# Patient Record
Sex: MALE | Race: WHITE | Employment: FULL TIME | URBAN - METROPOLITAN AREA
[De-identification: names, ages, dates, MRNs, and addresses within clinical notes are randomized per-mention and may not be internally consistent; named-entity substitution may affect disease eponyms.]

---

## 2017-08-15 ENCOUNTER — HOSPITAL ENCOUNTER (OUTPATIENT)
Dept: CT IMAGING | Age: 62
Discharge: HOME | End: 2017-08-15
Attending: OTOLARYNGOLOGY
Payer: COMMERCIAL

## 2017-08-15 DIAGNOSIS — H91.20 SUDDEN HEARING LOSS: ICD-10-CM

## 2017-08-15 LAB — CREAT BLD-MCNC: 1.2 MG/DL (ref 0.6–1.3)

## 2017-08-15 PROCEDURE — 82565 ASSAY OF CREATININE: CPT

## 2017-08-15 PROCEDURE — 70470 CT HEAD/BRAIN W/O & W/DYE: CPT

## 2017-08-15 PROCEDURE — 74011250636 HC RX REV CODE- 250/636: Performed by: OTOLARYNGOLOGY

## 2017-08-15 PROCEDURE — 74011636320 HC RX REV CODE- 636/320: Performed by: OTOLARYNGOLOGY

## 2017-08-15 RX ORDER — SODIUM CHLORIDE 9 MG/ML
50 INJECTION, SOLUTION INTRAVENOUS
Status: COMPLETED | OUTPATIENT
Start: 2017-08-15 | End: 2017-08-15

## 2017-08-15 RX ORDER — SODIUM CHLORIDE 0.9 % (FLUSH) 0.9 %
10 SYRINGE (ML) INJECTION
Status: COMPLETED | OUTPATIENT
Start: 2017-08-15 | End: 2017-08-15

## 2017-08-15 RX ADMIN — Medication 10 ML: at 09:10

## 2017-08-15 RX ADMIN — SODIUM CHLORIDE 50 ML/HR: 900 INJECTION, SOLUTION INTRAVENOUS at 09:10

## 2017-08-15 RX ADMIN — IOPAMIDOL 100 ML: 612 INJECTION, SOLUTION INTRAVENOUS at 09:10

## 2022-01-19 ENCOUNTER — APPOINTMENT (OUTPATIENT)
Dept: CT IMAGING | Age: 67
DRG: 638 | End: 2022-01-19
Attending: EMERGENCY MEDICINE
Payer: MEDICARE

## 2022-01-19 ENCOUNTER — TRANSCRIBE ORDER (OUTPATIENT)
Dept: SCHEDULING | Age: 67
End: 2022-01-19

## 2022-01-19 ENCOUNTER — HOSPITAL ENCOUNTER (INPATIENT)
Age: 67
LOS: 4 days | Discharge: HOME | DRG: 638 | End: 2022-01-23
Attending: EMERGENCY MEDICINE | Admitting: INTERNAL MEDICINE
Payer: MEDICARE

## 2022-01-19 DIAGNOSIS — L03.116 CELLULITIS OF LEFT LOWER EXTREMITY: Primary | ICD-10-CM

## 2022-01-19 DIAGNOSIS — S97.82XD CRUSHING INJURY OF LEFT FOOT, SUBSEQUENT ENCOUNTER: Primary | ICD-10-CM
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
## 2022-01-19 LAB
ALBUMIN SERPL-MCNC: 4.5 G/DL (ref 3.5–5)
ALBUMIN/GLOB SERPL: 1.4 {RATIO} (ref 1.1–2.2)
ALP SERPL-CCNC: 50 U/L (ref 45–117)
ALT SERPL-CCNC: 37 U/L (ref 12–78)
ANION GAP SERPL CALC-SCNC: 7 MMOL/L (ref 5–15)
AST SERPL-CCNC: 28 U/L (ref 15–37)
BASOPHILS # BLD: 0 K/UL (ref 0–0.1)
BASOPHILS NFR BLD: 0 % (ref 0–1)
BILIRUB SERPL-MCNC: 0.5 MG/DL (ref 0.2–1)
BUN SERPL-MCNC: 19 MG/DL (ref 6–20)
BUN/CREAT SERPL: 13 (ref 12–20)
CALCIUM SERPL-MCNC: 9.3 MG/DL (ref 8.5–10.1)
CHLORIDE SERPL-SCNC: 103 MMOL/L (ref 97–108)
CO2 SERPL-SCNC: 27 MMOL/L (ref 21–32)
CREAT SERPL-MCNC: 1.48 MG/DL (ref 0.7–1.3)
DIFFERENTIAL METHOD BLD: ABNORMAL
EOSINOPHIL # BLD: 0.1 K/UL (ref 0–0.4)
EOSINOPHIL NFR BLD: 1 % (ref 0–7)
ERYTHROCYTE [DISTWIDTH] IN BLOOD BY AUTOMATED COUNT: 13.5 % (ref 11.5–14.5)
GLOBULIN SER CALC-MCNC: 3.3 G/DL (ref 2–4)
GLUCOSE BLD STRIP.AUTO-MCNC: 157 MG/DL (ref 65–117)
GLUCOSE SERPL-MCNC: 135 MG/DL (ref 65–100)
HCT VFR BLD AUTO: 42.3 % (ref 36.6–50.3)
HGB BLD-MCNC: 14 G/DL (ref 12.1–17)
IMM GRANULOCYTES # BLD AUTO: 0 K/UL (ref 0–0.04)
IMM GRANULOCYTES NFR BLD AUTO: 1 % (ref 0–0.5)
LYMPHOCYTES # BLD: 1.7 K/UL (ref 0.8–3.5)
LYMPHOCYTES NFR BLD: 26 % (ref 12–49)
MCH RBC QN AUTO: 32.3 PG (ref 26–34)
MCHC RBC AUTO-ENTMCNC: 33.1 G/DL (ref 30–36.5)
MCV RBC AUTO: 97.5 FL (ref 80–99)
MONOCYTES # BLD: 0.7 K/UL (ref 0–1)
MONOCYTES NFR BLD: 11 % (ref 5–13)
NEUTS SEG # BLD: 3.9 K/UL (ref 1.8–8)
NEUTS SEG NFR BLD: 61 % (ref 32–75)
NRBC # BLD: 0 K/UL (ref 0–0.01)
NRBC BLD-RTO: 0 PER 100 WBC
PLATELET # BLD AUTO: 141 K/UL (ref 150–400)
PMV BLD AUTO: 8.8 FL (ref 8.9–12.9)
POTASSIUM SERPL-SCNC: 4.4 MMOL/L (ref 3.5–5.1)
PROT SERPL-MCNC: 7.8 G/DL (ref 6.4–8.2)
RBC # BLD AUTO: 4.34 M/UL (ref 4.1–5.7)
SERVICE CMNT-IMP: ABNORMAL
SODIUM SERPL-SCNC: 137 MMOL/L (ref 136–145)
WBC # BLD AUTO: 6.4 K/UL (ref 4.1–11.1)

## 2022-01-19 PROCEDURE — 74011250636 HC RX REV CODE- 250/636: Performed by: INTERNAL MEDICINE

## 2022-01-19 PROCEDURE — 87040 BLOOD CULTURE FOR BACTERIA: CPT

## 2022-01-19 PROCEDURE — 99283 EMERGENCY DEPT VISIT LOW MDM: CPT

## 2022-01-19 PROCEDURE — 74011250637 HC RX REV CODE- 250/637: Performed by: EMERGENCY MEDICINE

## 2022-01-19 PROCEDURE — 65660000000 HC RM CCU STEPDOWN

## 2022-01-19 PROCEDURE — 80053 COMPREHEN METABOLIC PANEL: CPT

## 2022-01-19 PROCEDURE — 74011250637 HC RX REV CODE- 250/637: Performed by: INTERNAL MEDICINE

## 2022-01-19 PROCEDURE — 85025 COMPLETE CBC W/AUTO DIFF WBC: CPT

## 2022-01-19 PROCEDURE — 73700 CT LOWER EXTREMITY W/O DYE: CPT

## 2022-01-19 PROCEDURE — 82962 GLUCOSE BLOOD TEST: CPT

## 2022-01-19 PROCEDURE — 36415 COLL VENOUS BLD VENIPUNCTURE: CPT

## 2022-01-19 PROCEDURE — 74011000258 HC RX REV CODE- 258: Performed by: INTERNAL MEDICINE

## 2022-01-19 PROCEDURE — 74011000250 HC RX REV CODE- 250: Performed by: INTERNAL MEDICINE

## 2022-01-19 RX ORDER — INSULIN LISPRO 100 [IU]/ML
INJECTION, SOLUTION INTRAVENOUS; SUBCUTANEOUS
Status: DISCONTINUED | OUTPATIENT
Start: 2022-01-19 | End: 2022-01-23 | Stop reason: HOSPADM

## 2022-01-19 RX ORDER — ACETAMINOPHEN 325 MG/1
650 TABLET ORAL
Status: DISCONTINUED | OUTPATIENT
Start: 2022-01-19 | End: 2022-01-19 | Stop reason: SDUPTHER

## 2022-01-19 RX ORDER — LISINOPRIL 5 MG/1
10 TABLET ORAL DAILY
Status: DISCONTINUED | OUTPATIENT
Start: 2022-01-20 | End: 2022-01-23 | Stop reason: HOSPADM

## 2022-01-19 RX ORDER — OXYCODONE AND ACETAMINOPHEN 5; 325 MG/1; MG/1
1 TABLET ORAL
Status: DISCONTINUED | OUTPATIENT
Start: 2022-01-19 | End: 2022-01-23 | Stop reason: HOSPADM

## 2022-01-19 RX ORDER — ENOXAPARIN SODIUM 100 MG/ML
40 INJECTION SUBCUTANEOUS DAILY
Status: DISCONTINUED | OUTPATIENT
Start: 2022-01-20 | End: 2022-01-23 | Stop reason: HOSPADM

## 2022-01-19 RX ORDER — OXYCODONE AND ACETAMINOPHEN 5; 325 MG/1; MG/1
1 TABLET ORAL
Status: COMPLETED | OUTPATIENT
Start: 2022-01-19 | End: 2022-01-19

## 2022-01-19 RX ORDER — DEXTROSE 50 % IN WATER (D50W) INTRAVENOUS SYRINGE
12.5-25 AS NEEDED
Status: DISCONTINUED | OUTPATIENT
Start: 2022-01-19 | End: 2022-01-23 | Stop reason: HOSPADM

## 2022-01-19 RX ORDER — POLYETHYLENE GLYCOL 3350 17 G/17G
17 POWDER, FOR SOLUTION ORAL DAILY PRN
Status: DISCONTINUED | OUTPATIENT
Start: 2022-01-19 | End: 2022-01-23 | Stop reason: HOSPADM

## 2022-01-19 RX ORDER — MAGNESIUM SULFATE 100 %
4 CRYSTALS MISCELLANEOUS AS NEEDED
Status: DISCONTINUED | OUTPATIENT
Start: 2022-01-19 | End: 2022-01-23 | Stop reason: HOSPADM

## 2022-01-19 RX ORDER — SODIUM CHLORIDE 0.9 % (FLUSH) 0.9 %
5-40 SYRINGE (ML) INJECTION AS NEEDED
Status: DISCONTINUED | OUTPATIENT
Start: 2022-01-19 | End: 2022-01-23 | Stop reason: HOSPADM

## 2022-01-19 RX ORDER — ACETAMINOPHEN 650 MG/1
650 SUPPOSITORY RECTAL
Status: DISCONTINUED | OUTPATIENT
Start: 2022-01-19 | End: 2022-01-23 | Stop reason: HOSPADM

## 2022-01-19 RX ORDER — VANCOMYCIN HYDROCHLORIDE
1250
Status: DISCONTINUED | OUTPATIENT
Start: 2022-01-20 | End: 2022-01-21

## 2022-01-19 RX ORDER — PANTOPRAZOLE SODIUM 40 MG/1
40 TABLET, DELAYED RELEASE ORAL
Status: DISCONTINUED | OUTPATIENT
Start: 2022-01-20 | End: 2022-01-23 | Stop reason: HOSPADM

## 2022-01-19 RX ORDER — ACETAMINOPHEN 325 MG/1
650 TABLET ORAL
Status: DISCONTINUED | OUTPATIENT
Start: 2022-01-19 | End: 2022-01-23 | Stop reason: HOSPADM

## 2022-01-19 RX ORDER — SODIUM CHLORIDE 0.9 % (FLUSH) 0.9 %
5-40 SYRINGE (ML) INJECTION EVERY 8 HOURS
Status: DISCONTINUED | OUTPATIENT
Start: 2022-01-19 | End: 2022-01-23 | Stop reason: HOSPADM

## 2022-01-19 RX ORDER — ROSUVASTATIN CALCIUM 10 MG/1
20 TABLET, COATED ORAL
Status: DISCONTINUED | OUTPATIENT
Start: 2022-01-19 | End: 2022-01-23 | Stop reason: HOSPADM

## 2022-01-19 RX ORDER — BISOPROLOL FUMARATE AND HYDROCHLOROTHIAZIDE 5; 6.25 MG/1; MG/1
1 TABLET ORAL DAILY
Status: DISCONTINUED | OUTPATIENT
Start: 2022-01-20 | End: 2022-01-23 | Stop reason: HOSPADM

## 2022-01-19 RX ORDER — ONDANSETRON 2 MG/ML
4 INJECTION INTRAMUSCULAR; INTRAVENOUS
Status: DISCONTINUED | OUTPATIENT
Start: 2022-01-19 | End: 2022-01-23 | Stop reason: HOSPADM

## 2022-01-19 RX ORDER — ONDANSETRON 4 MG/1
4 TABLET, ORALLY DISINTEGRATING ORAL
Status: DISCONTINUED | OUTPATIENT
Start: 2022-01-19 | End: 2022-01-23 | Stop reason: HOSPADM

## 2022-01-19 RX ORDER — VANCOMYCIN 2 GRAM/500 ML IN 0.9 % SODIUM CHLORIDE INTRAVENOUS
2000
Status: COMPLETED | OUTPATIENT
Start: 2022-01-19 | End: 2022-01-20

## 2022-01-19 RX ORDER — ASPIRIN 81 MG/1
81 TABLET ORAL DAILY
Status: DISCONTINUED | OUTPATIENT
Start: 2022-01-20 | End: 2022-01-23 | Stop reason: HOSPADM

## 2022-01-19 RX ADMIN — ROSUVASTATIN CALCIUM 20 MG: 10 TABLET, FILM COATED ORAL at 22:14

## 2022-01-19 RX ADMIN — OXYCODONE AND ACETAMINOPHEN 1 TABLET: 5; 325 TABLET ORAL at 18:55

## 2022-01-19 RX ADMIN — CEFTRIAXONE 1 G: 1 INJECTION, POWDER, FOR SOLUTION INTRAMUSCULAR; INTRAVENOUS at 19:41

## 2022-01-19 RX ADMIN — VANCOMYCIN HYDROCHLORIDE 2000 MG: 5 INJECTION, POWDER, LYOPHILIZED, FOR SOLUTION INTRAVENOUS at 19:41

## 2022-01-19 RX ADMIN — ACETAMINOPHEN 650 MG: 325 TABLET ORAL at 22:21

## 2022-01-19 RX ADMIN — SODIUM CHLORIDE, PRESERVATIVE FREE 10 ML: 5 INJECTION INTRAVENOUS at 22:14

## 2022-01-19 NOTE — ED PROVIDER NOTES
EMERGENCY DEPARTMENT HISTORY AND PHYSICAL EXAM      Date: 1/19/2022  Patient Name: Vu Monahan    History of Presenting Illness     Chief Complaint   Patient presents with    Wound Check     Patient stated he was sent to ED by Dr. Leola Chester for antibiotics for an infected left which he sustained form a tractor blade on 1/11. Patient now has pain, swelling and black discoloration on aterior aspect of left foot. History Provided By: Patient    HPI: Vu Monahan, 74 y.o. male presents to the ED with cc of left foot wound. 40-year-old male presents emergency department with a chief complaint of L foot wound. Patient reports symptoms began after he dropped a snowplow blade that weighed a couple hundred pounds on his left foot. He did not lacerate his foot. He reports he saw Ortho on-call as well as orthopedic surgery today. Patient was referred to the ED for admission. Patient reports he has pain in the midfoot. Occasional burning pain. Worse with bearing weight. He notes an eschar to the dorsum of the foot as well as some surrounding erythema. Denies fevers or chills, chest pain or shortness of breath. There are no other complaints, changes, or physical findings at this time. PCP: Garcia Shin NP    No current facility-administered medications on file prior to encounter. Current Outpatient Medications on File Prior to Encounter   Medication Sig Dispense Refill    promethazine (PHENERGAN) 25 mg tablet Take 1 Tab by mouth every six (6) hours as needed. 20 Tab 0    oxyCODONE-acetaminophen (PERCOCET) 5-325 mg per tablet Take 1-2 tablets by mouth every four (4) hours as needed for Pain. 30 tablet 0    ondansetron (ZOFRAN ODT) 4 mg disintegrating tablet Take 1 tablet by mouth every eight (8) hours as needed for Nausea. 10 tablet 0    nitroglycerin (NITROSTAT) 0.4 mg SL tablet by SubLINGual route every five (5) minutes as needed for Chest Pain.       metformin (GLUCOPHAGE) 1,000 mg tablet Take 1,000 mg by mouth two (2) times daily (with meals).  LIRAGLUTIDE (VICTOZA 2-STEPH SC) by SubCUTAneous route.  dapagliflozin (FARXIGA) 5 mg tab Take  by mouth.  bisoprolol-hydrochlorothiazide (ZIAC) 5-6.25 mg per tablet Take 1 tablet by mouth daily.  omeprazole (PRILOSEC) 40 mg capsule Take 40 mg by mouth daily.  rosuvastatin (CRESTOR) 20 mg tablet Take 20 mg by mouth nightly.  lisinopril (PRINIVIL, ZESTRIL) 10 mg tablet Take  by mouth daily.  aspirin delayed-release 81 mg tablet Take  by mouth daily. Past History     Past Medical History:  Past Medical History:   Diagnosis Date    Diabetes ([facility] [address]     Hypercholesterolemia     Hypertension        Past Surgical History:  Past Surgical History:   Procedure Laterality Date    HX CHOLECYSTECTOMY  2/3/15    Laparoscopic cholecystectomy    HX GI      colonoscopy    HX ORTHOPAEDIC      left wrist fx   [place]    B[facility] Surgery @ [facility] - hemorrhage        Family History:  Family History   Problem Relation Age of Onset   Theodoro Milder Stroke Mother     Other Mother         aneurysm    Hypertension Father     Cancer Father         Bone and Lung Cancer       Social History:  Social History     Tobacco Use    Smoking status: Former Smoker     Packs/day: 1.00     Years: 20.00     Pack years: 20.00     Quit date: 2000     Years since quittin.9    Smokeless tobacco: Not on file   Substance Use Topics    Alcohol use: Yes     Comment: \"a couple of beers a day\"    Drug use: No       Allergies:  No Known Allergies      Review of Systems   Review of Systems   Constitutional: Negative for chills and fever. HENT: Negative for voice change. Eyes: Negative for pain and redness. Respiratory: Negative for cough and chest tightness. Cardiovascular: Negative for chest pain and leg swelling. Gastrointestinal: Negative for abdominal pain, diarrhea, nausea and vomiting. Genitourinary: Negative for hematuria. Musculoskeletal: Negative for gait problem. Skin: Positive for color change and wound. Negative for pallor and rash. Neurological: Negative for facial asymmetry, weakness and headaches. Hematological: Does not bruise/bleed easily. Psychiatric/Behavioral: Negative for behavioral problems. All other systems reviewed and are negative. Physical Exam   Physical Exam  Vitals and nursing note reviewed. Constitutional:       Comments: 76-year-old male, resting in bed, no distress   HENT:      Head: Normocephalic and atraumatic. Nose: Nose normal.      Mouth/Throat:      Mouth: Mucous membranes are moist.   Eyes:      Pupils: Pupils are equal, round, and reactive to light. Cardiovascular:      Rate and Rhythm: Normal rate and regular rhythm. Pulses: Normal pulses. Heart sounds: No murmur heard. No friction rub. No gallop. Pulmonary:      Effort: Pulmonary effort is normal.      Breath sounds: Normal breath sounds. No wheezing, rhonchi or rales. Abdominal:      General: Abdomen is flat. There is no distension. Palpations: Abdomen is soft. Tenderness: There is no abdominal tenderness. Musculoskeletal:         General: Swelling, tenderness and signs of injury present. Normal range of motion. Cervical back: Normal range of motion. Right lower leg: No edema. Left lower leg: No edema. Comments: There is swelling of the forefoot with ecchymosis of all 5 toes. There is tenderness to palpation. 1+ DP and PT pulses. Compartments soft. Skin:     General: Skin is warm and dry. Capillary Refill: Capillary refill takes less than 2 seconds. Findings: Erythema present. Comments: There is a 2 x 2 centimeter eschar over the dorsum of the left lateral foot. There is surrounding erythema and a flaccid bullae distally. Neurological:      General: No focal deficit present. Mental Status: He is alert. Psychiatric:         Mood and Affect: Mood normal.         Diagnostic Study Results     Labs -     Recent Results (from the past 12 hour(s))   CBC WITH AUTOMATED DIFF    Collection Time: 01/19/22  3:55 PM   Result Value Ref Range    WBC 6.4 4.1 - 11.1 K/uL    RBC 4.34 4.10 - 5.70 M/uL    HGB 14.0 12.1 - 17.0 g/dL    HCT 42.3 36.6 - 50.3 %    MCV 97.5 80.0 - 99.0 FL    MCH 32.3 26.0 - 34.0 PG    MCHC 33.1 30.0 - 36.5 g/dL    RDW 13.5 11.5 - 14.5 %    PLATELET 170 (L) 607 - 400 K/uL    MPV 8.8 (L) 8.9 - 12.9 FL    NRBC 0.0 0  WBC    ABSOLUTE NRBC 0.00 0.00 - 0.01 K/uL    NEUTROPHILS 61 32 - 75 %    LYMPHOCYTES 26 12 - 49 %    MONOCYTES 11 5 - 13 %    EOSINOPHILS 1 0 - 7 %    BASOPHILS 0 0 - 1 %    IMMATURE GRANULOCYTES 1 (H) 0.0 - 0.5 %    ABS. NEUTROPHILS 3.9 1.8 - 8.0 K/UL    ABS. LYMPHOCYTES 1.7 0.8 - 3.5 K/UL    ABS. MONOCYTES 0.7 0.0 - 1.0 K/UL    ABS. EOSINOPHILS 0.1 0.0 - 0.4 K/UL    ABS. BASOPHILS 0.0 0.0 - 0.1 K/UL    ABS. IMM. GRANS. 0.0 0.00 - 0.04 K/UL    DF AUTOMATED     METABOLIC PANEL, COMPREHENSIVE    Collection Time: 01/19/22  3:55 PM   Result Value Ref Range    Sodium 137 136 - 145 mmol/L    Potassium 4.4 3.5 - 5.1 mmol/L    Chloride 103 97 - 108 mmol/L    CO2 27 21 - 32 mmol/L    Anion gap 7 5 - 15 mmol/L    Glucose 135 (H) 65 - 100 mg/dL    BUN 19 6 - 20 MG/DL    Creatinine 1.48 (H) 0.70 - 1.30 MG/DL    BUN/Creatinine ratio 13 12 - 20      GFR est AA 57 (L) >60 ml/min/1.73m2    GFR est non-AA 47 (L) >60 ml/min/1.73m2    Calcium 9.3 8.5 - 10.1 MG/DL    Bilirubin, total 0.5 0.2 - 1.0 MG/DL    ALT (SGPT) 37 12 - 78 U/L    AST (SGOT) 28 15 - 37 U/L    Alk.  phosphatase 50 45 - 117 U/L    Protein, total 7.8 6.4 - 8.2 g/dL    Albumin 4.5 3.5 - 5.0 g/dL    Globulin 3.3 2.0 - 4.0 g/dL    A-G Ratio 1.4 1.1 - 2.2         Radiologic Studies -   CT LOW EXT LT WO CONT    (Results Pending)     CT Results  (Last 48 hours)    None        CXR Results  (Last 48 hours)    None          Medical Decision Making I am the first provider for this patient. I reviewed the vital signs, available nursing notes, past medical history, past surgical history, family history and social history. Vital Signs-Reviewed the patient's vital signs. Patient Vitals for the past 12 hrs:   Temp Pulse Resp BP SpO2   01/19/22 1543 97.7 °F (36.5 °C) 74 16 131/68 95 %     Records Reviewed: Nursing Notes and Old Medical Records    Provider Notes (Medical Decision Making):     74 YOM presents with L foot pain after a crush injury. Patient's vitals are stable. Clinically he does appear to have cellulitis around L foot. Other considerations include abscess or osteomyelitis from crush injury. There may be an underlying fracture. I do not have radiology results to review. Does not appear to be necrotizing fasciitis. Discussed with Esvin Jenkins, recommends IV antibiotics and CT. Ortho consult, saw outpatient ortho today. Labs are unremarkable, will send blood cultures. Discussed with hospitalist for admission. ED Course:   Initial assessment performed. The patients presenting problems have been discussed, and they are in agreement with the care plan formulated and outlined with them. I have encouraged them to ask questions as they arise throughout their visit. Christine Morris MD      Disposition:    Admitted      Diagnosis     Clinical Impression:   1. Cellulitis of left lower extremity    2. Crush accident        Attestations:    Christine Morris MD    Please note that this dictation was completed with [email or web address], the computer voice recognition software. Quite often unanticipated grammatical, syntax, homophones, and other interpretive errors are inadvertently transcribed by the computer software. Please disregard these errors. Please excuse any errors that have escaped final proofreading. Thank you.

## 2022-01-20 LAB
ANION GAP SERPL CALC-SCNC: 6 MMOL/L (ref 5–15)
BUN SERPL-MCNC: 20 MG/DL (ref 6–20)
BUN/CREAT SERPL: 19 (ref 12–20)
CALCIUM SERPL-MCNC: 8.7 MG/DL (ref 8.5–10.1)
CHLORIDE SERPL-SCNC: 106 MMOL/L (ref 97–108)
CO2 SERPL-SCNC: 26 MMOL/L (ref 21–32)
CREAT SERPL-MCNC: 1.06 MG/DL (ref 0.7–1.3)
ERYTHROCYTE [DISTWIDTH] IN BLOOD BY AUTOMATED COUNT: 13.5 % (ref 11.5–14.5)
GLUCOSE BLD STRIP.AUTO-MCNC: 152 MG/DL (ref 65–117)
GLUCOSE BLD STRIP.AUTO-MCNC: 183 MG/DL (ref 65–117)
GLUCOSE BLD STRIP.AUTO-MCNC: 259 MG/DL (ref 65–117)
GLUCOSE SERPL-MCNC: 139 MG/DL (ref 65–100)
HCT VFR BLD AUTO: 37.5 % (ref 36.6–50.3)
HGB BLD-MCNC: 12.6 G/DL (ref 12.1–17)
MCH RBC QN AUTO: 32.3 PG (ref 26–34)
MCHC RBC AUTO-ENTMCNC: 33.6 G/DL (ref 30–36.5)
MCV RBC AUTO: 96.2 FL (ref 80–99)
NRBC # BLD: 0 K/UL (ref 0–0.01)
NRBC BLD-RTO: 0 PER 100 WBC
PLATELET # BLD AUTO: 130 K/UL (ref 150–400)
PMV BLD AUTO: 8.7 FL (ref 8.9–12.9)
POTASSIUM SERPL-SCNC: 3.7 MMOL/L (ref 3.5–5.1)
RBC # BLD AUTO: 3.9 M/UL (ref 4.1–5.7)
SERVICE CMNT-IMP: ABNORMAL
SODIUM SERPL-SCNC: 138 MMOL/L (ref 136–145)
WBC # BLD AUTO: 5.6 K/UL (ref 4.1–11.1)

## 2022-01-20 PROCEDURE — 74011250637 HC RX REV CODE- 250/637: Performed by: INTERNAL MEDICINE

## 2022-01-20 PROCEDURE — 74011000258 HC RX REV CODE- 258: Performed by: INTERNAL MEDICINE

## 2022-01-20 PROCEDURE — 74011000250 HC RX REV CODE- 250: Performed by: INTERNAL MEDICINE

## 2022-01-20 PROCEDURE — 80048 BASIC METABOLIC PNL TOTAL CA: CPT

## 2022-01-20 PROCEDURE — 85027 COMPLETE CBC AUTOMATED: CPT

## 2022-01-20 PROCEDURE — 65660000000 HC RM CCU STEPDOWN

## 2022-01-20 PROCEDURE — 36415 COLL VENOUS BLD VENIPUNCTURE: CPT

## 2022-01-20 PROCEDURE — 74011250636 HC RX REV CODE- 250/636: Performed by: INTERNAL MEDICINE

## 2022-01-20 PROCEDURE — 82962 GLUCOSE BLOOD TEST: CPT

## 2022-01-20 RX ADMIN — ENOXAPARIN SODIUM 40 MG: 100 INJECTION SUBCUTANEOUS at 09:57

## 2022-01-20 RX ADMIN — SODIUM CHLORIDE, PRESERVATIVE FREE 10 ML: 5 INJECTION INTRAVENOUS at 22:01

## 2022-01-20 RX ADMIN — ACETAMINOPHEN 650 MG: 325 TABLET ORAL at 05:56

## 2022-01-20 RX ADMIN — LISINOPRIL 10 MG: 5 TABLET ORAL at 09:57

## 2022-01-20 RX ADMIN — VANCOMYCIN HYDROCHLORIDE 1250 MG: 10 INJECTION, POWDER, LYOPHILIZED, FOR SOLUTION INTRAVENOUS at 17:39

## 2022-01-20 RX ADMIN — SODIUM CHLORIDE, PRESERVATIVE FREE 10 ML: 5 INJECTION INTRAVENOUS at 14:00

## 2022-01-20 RX ADMIN — ACETAMINOPHEN 650 MG: 325 TABLET ORAL at 23:02

## 2022-01-20 RX ADMIN — PANTOPRAZOLE SODIUM 40 MG: 40 TABLET, DELAYED RELEASE ORAL at 09:57

## 2022-01-20 RX ADMIN — ACETAMINOPHEN 650 MG: 325 TABLET ORAL at 15:17

## 2022-01-20 RX ADMIN — PIPERACILLIN AND TAZOBACTAM 3.38 G: 3; .375 INJECTION, POWDER, LYOPHILIZED, FOR SOLUTION INTRAVENOUS at 21:56

## 2022-01-20 RX ADMIN — PIPERACILLIN AND TAZOBACTAM 3.38 G: 3; .375 INJECTION, POWDER, LYOPHILIZED, FOR SOLUTION INTRAVENOUS at 05:45

## 2022-01-20 RX ADMIN — PIPERACILLIN AND TAZOBACTAM 3.38 G: 3; .375 INJECTION, POWDER, LYOPHILIZED, FOR SOLUTION INTRAVENOUS at 00:16

## 2022-01-20 RX ADMIN — ASPIRIN 81 MG: 81 TABLET, COATED ORAL at 09:57

## 2022-01-20 RX ADMIN — ROSUVASTATIN CALCIUM 20 MG: 10 TABLET, FILM COATED ORAL at 21:56

## 2022-01-20 RX ADMIN — PIPERACILLIN AND TAZOBACTAM 3.38 G: 3; .375 INJECTION, POWDER, LYOPHILIZED, FOR SOLUTION INTRAVENOUS at 15:17

## 2022-01-20 RX ADMIN — SODIUM CHLORIDE, PRESERVATIVE FREE 10 ML: 5 INJECTION INTRAVENOUS at 05:46

## 2022-01-20 NOTE — PROGRESS NOTES
End of Shift Note    Bedside shift change report given to Thomas Jenkins RN (oncoming nurse) by Silviano Zavala LPN (offgoing nurse). Report included the following information SBAR, Kardex, ED Summary, Intake/Output, MAR, Med Rec Status and Cardiac Rhythm NSR    Shift worked:  7p-730a     Shift summary and any significant changes:     Pt had uneventful night. Treated pain prn. Pt rested well and otherwise had no complaints. Informed oncoming nurse that Admission Database was not completed as pt wanted to sleep. Concerns for physician to address:       Zone phone for oncoming shift:          Activity:  Activity Level: Up ad danny  Number times ambulated in hallways past shift: 0  Number of times OOB to chair past shift: 0    Cardiac:   Cardiac Monitoring: Yes           Access:   Current line(s): PIV     Genitourinary:   Urinary status: voiding    Respiratory:   O2 Device: None (Room air)  Chronic home O2 use?: NO  Incentive spirometer at bedside: NO     GI:     Current diet:  ADULT DIET Regular; 4 carb choices (60 gm/meal)  Passing flatus: YES  Tolerating current diet: YES       Pain Management:   Patient states pain is manageable on current regimen: YES    Skin:  Shin Score: 20  Interventions: increase time out of bed    Patient Safety:  Fall Score:  Total Score: 1  Interventions: assistive device (walker, cane, etc) and stay with me (per policy)       Length of Stay:  Expected LOS: - - -  Actual LOS: 1      Silviano Zavala LPN

## 2022-01-20 NOTE — H&P
Hospitalist Admission Note    NAME: Ezequiel Bull   :  1955   MRN:  [id number]     Date/Time:  2022 9:51 PM    Patient PCP: Efren Ivey NP  ________________________________________________________________________    My assessment of this patient's clinical condition and my plan of care is as follows. Assessment / Plan:  Left foot cellulitis  Left foot crush injury with a snowplow blade  -No criteria for sepsis  -Start empiric antibiotics including vancomycin and Zosyn to cover for Pseudomonas  -Ortho consulted  -Keep left foot elevated. Pain control. -CT shows evidence of contusion but no abscess or fracture    Hypertension  Dyslipidemia  GERD  -Continue home bisoprolol, HCTZ, lisinopril, Crestor, PPI    Diabetes mellitus  -Holding home metformin and Victoza. Start insulin sliding scale with blood sugar checks        Code Status: Full code  Surrogate Decision Maker: Wife Dimple Mendez    DVT Prophylaxis: Lovenox  GI Prophylaxis: not indicated    Baseline: From home, independent of ADLs        Subjective:   CHIEF COMPLAINT: Left leg pain and redness    HISTORY OF PRESENT ILLNESS:     Cirilo Chin is a 74 y.o.   male who presents with past medical history of diabetes mellitus, hypertension, dyslipidemia is coming the hospital chief complaints of left foot pain, redness and also black spot. Patient reports that he dropped a snowplow blade on  and subsequently went to see his orthopedist today who advised him to go to the emergency department for further evaluation. He currently reports pain about 5 x 10, increases with touch and also movement and without any relieving factors. He also reports worsening swelling along with a black spot on the lateral aspect of his foot. On arrival to ED, vital signs are normal.  labs show a creatinine of 1.4. CT shows dorsal midfoot soft tissue contusion. We were asked to admit for work up and evaluation of the above problems. Past Medical History:   Diagnosis Date    Diabetes ([address]     Hypercholesterolemia     Hypertension         Past Surgical History:   Procedure Laterality Date    HX CHOLECYSTECTOMY  2/3/15    Laparoscopic cholecystectomy    HX GI      colonoscopy    HX ORTHOPAEDIC      left wrist fx   [place]    B[facility] Surgery @ [facility] [place] - hemorrhage        Social History     Tobacco Use    Smoking status: Former Smoker     Packs/day: 1.00     Years: 20.00     Pack years: 20.00     Quit date: 2000     Years since quittin.9    Smokeless tobacco: Not on file   Substance Use Topics    Alcohol use: Yes     Comment: \"a couple of beers a day\"        Family History   Problem Relation Age of Onset   Dinah Adkins Stroke Mother     Other Mother         aneurysm    Hypertension Father     Cancer Father         Bone and Lung Cancer     No Known Allergies     Prior to Admission medications    Medication Sig Start Date End Date Taking? Authorizing Provider   promethazine (PHENERGAN) 25 mg tablet Take 1 Tab by mouth every six (6) hours as needed. 7/25/15   Quinten Lazo MD   oxyCODONE-acetaminophen (PERCOCET) 5-325 mg per tablet Take 1-2 tablets by mouth every four (4) hours as needed for Pain. 2/3/15   Ephraim Wilkerson MD   ondansetron (ZOFRAN ODT) 4 mg disintegrating tablet Take 1 tablet by mouth every eight (8) hours as needed for Nausea. 2/3/15   Ephraim Wilkerson MD   nitroglycerin (NITROSTAT) 0.4 mg SL tablet by SubLINGual route every five (5) minutes as needed for Chest Pain. Provider, Historical   metformin (GLUCOPHAGE) 1,000 mg tablet Take 1,000 mg by mouth two (2) times daily (with meals). Provider, Historical   LIRAGLUTIDE (VICTOZA 2-STEPH SC) by SubCUTAneous route. Provider, Historical   dapagliflozin (FARXIGA) 5 mg tab Take  by mouth. Provider, Historical   bisoprolol-hydrochlorothiazide (ZIAC) 5-6.25 mg per tablet Take 1 tablet by mouth daily. Provider, Historical   omeprazole (PRILOSEC) 40 mg capsule Take 40 mg by mouth daily. Provider, Historical   rosuvastatin (CRESTOR) 20 mg tablet Take 20 mg by mouth nightly. Provider, Historical   lisinopril (PRINIVIL, ZESTRIL) 10 mg tablet Take  by mouth daily. Provider, Historical   aspirin delayed-release 81 mg tablet Take  by mouth daily. Provider, Historical       REVIEW OF SYSTEMS:     I am not able to complete the review of systems because:    The patient is intubated and sedated    The patient has altered mental status due to his acute medical problems    The patient has baseline aphasia from prior stroke(s)    The patient has baseline dementia and is not reliable historian    The patient is in acute medical distress and unable to provide information           Total of 12 systems reviewed as follows:       POSITIVE= underlined text  Negative = text not underlined  General:  fever, chills, sweats, generalized weakness, weight loss/gain,      loss of appetite   Eyes:    blurred vision, eye pain, loss of vision, double vision  ENT:    rhinorrhea, pharyngitis   Respiratory:   cough, sputum production, SOB, SWAN, wheezing, pleuritic pain   Cardiology:   chest pain, palpitations, orthopnea, PND, edema, syncope   Gastrointestinal:  abdominal pain , N/V, diarrhea, dysphagia, constipation, bleeding   Genitourinary:  frequency, urgency, dysuria, hematuria, incontinence   Muskuloskeletal :  arthralgia, myalgia, back pain  Hematology:  easy bruising, nose or gum bleeding, lymphadenopathy   Dermatological: rash, ulceration, pruritis, color change / jaundice  Endocrine:   hot flashes or polydipsia   Neurological:  headache, dizziness, confusion, focal weakness, paresthesia,     Speech difficulties, memory loss, gait difficulty  Psychological: Feelings of anxiety, depression, agitation    Objective:   VITALS:    Visit Vitals  /68   Pulse 74   Temp 97.7 °F (36.5 °C)   Resp 16   Ht 5' 11\" (1.803 m)   Wt 98.8 kg (217 lb 13 oz)   SpO2 95%   BMI 30.38 kg/m²       PHYSICAL EXAM:    General:    Alert, cooperative, no distress, appears stated age. HEENT: Atraumatic, anicteric sclerae, pink conjunctivae     No oral ulcers, mucosa moist, throat clear, dentition fair  Neck:  Supple, symmetrical,  thyroid: non tender  Lungs:   Clear to auscultation bilaterally. No Wheezing or Rhonchi. No rales. Chest wall:  No tenderness  No Accessory muscle use. Heart:   Regular  rhythm,  No  murmur   No edema  Abdomen:   Soft, non-tender. Not distended. Bowel sounds normal  Extremities: Left foot swelling, erythema, tenderness present, black spot on dorsal aspect present, bluish hue to fingers +   Skin:     Not pale. Not Jaundiced  No rashes   Psych:  Not anxious or agitated. Neurologic: EOMs intact. No facial asymmetry. No aphasia or slurred speech. Symmetrical strength, Sensation grossly intact. Alert and oriented X 4.     _______________________________________________________________________  Care Plan discussed with:    Comments   Patient y    Family      RN y    Care Manager                    Consultant:      _______________________________________________________________________  Expected  Disposition:   Home with Family y   HH/PT/OT/RN    SNF/LTC    [facility]    ________________________________________________________________________  TOTAL TIME:  61  Minutes    Critical Care Provided     Minutes non procedure based      Comments    y Reviewed previous records   >50% of visit spent in counseling and coordination of care y Discussion with patient and/or family and questions answered       ________________________________________________________________________  Signed: Phu Keller MD    Procedures: see electronic medical records for all procedures/Xrays and details which were not copied into this note but were reviewed prior to creation of Plan.     LAB DATA REVIEWED:    Recent Results (from the past 24 hour(s))   CBC WITH AUTOMATED DIFF    Collection Time: 01/19/22  3:55 PM   Result Value Ref Range    WBC 6.4 4.1 - 11.1 K/uL    RBC 4.34 4.10 - 5.70 M/uL    HGB 14.0 12.1 - 17.0 g/dL    HCT 42.3 36.6 - 50.3 %    MCV 97.5 80.0 - 99.0 FL    MCH 32.3 26.0 - 34.0 PG    MCHC 33.1 30.0 - 36.5 g/dL    RDW 13.5 11.5 - 14.5 %    PLATELET 163 (L) 839 - 400 K/uL    MPV 8.8 (L) 8.9 - 12.9 FL    NRBC 0.0 0  WBC    ABSOLUTE NRBC 0.00 0.00 - 0.01 K/uL    NEUTROPHILS 61 32 - 75 %    LYMPHOCYTES 26 12 - 49 %    MONOCYTES 11 5 - 13 %    EOSINOPHILS 1 0 - 7 %    BASOPHILS 0 0 - 1 %    IMMATURE GRANULOCYTES 1 (H) 0.0 - 0.5 %    ABS. NEUTROPHILS 3.9 1.8 - 8.0 K/UL    ABS. LYMPHOCYTES 1.7 0.8 - 3.5 K/UL    ABS. MONOCYTES 0.7 0.0 - 1.0 K/UL    ABS. EOSINOPHILS 0.1 0.0 - 0.4 K/UL    ABS. BASOPHILS 0.0 0.0 - 0.1 K/UL    ABS. IMM. GRANS. 0.0 0.00 - 0.04 K/UL    DF AUTOMATED     METABOLIC PANEL, COMPREHENSIVE    Collection Time: 01/19/22  3:55 PM   Result Value Ref Range    Sodium 137 136 - 145 mmol/L    Potassium 4.4 3.5 - 5.1 mmol/L    Chloride 103 97 - 108 mmol/L    CO2 27 21 - 32 mmol/L    Anion gap 7 5 - 15 mmol/L    Glucose 135 (H) 65 - 100 mg/dL    BUN 19 6 - 20 MG/DL    Creatinine 1.48 (H) 0.70 - 1.30 MG/DL    BUN/Creatinine ratio 13 12 - 20      GFR est AA 57 (L) >60 ml/min/1.73m2    GFR est non-AA 47 (L) >60 ml/min/1.73m2    Calcium 9.3 8.5 - 10.1 MG/DL    Bilirubin, total 0.5 0.2 - 1.0 MG/DL    ALT (SGPT) 37 12 - 78 U/L    AST (SGOT) 28 15 - 37 U/L    Alk.  phosphatase 50 45 - 117 U/L    Protein, total 7.8 6.4 - 8.2 g/dL    Albumin 4.5 3.5 - 5.0 g/dL    Globulin 3.3 2.0 - 4.0 g/dL    A-G Ratio 1.4 1.1 - 2.2

## 2022-01-20 NOTE — CONSULTS
ORTHOPAEDIC CONSULT NOTE    Subjective:     Date of Consultation:  2022      Chantel aHrmon is a 74 y.o. male who is being seen for left foot crush injury/cellulitis. Pt reports injury occurred , tractor implement/blade fell onto his foot. Ambulates at baseline unassisted. No sig pain at rest.  Discomfort comes in waves. Denies F/C/Flu like symptoms    States his DM is well controled. Was seen at [facility] x2, seen by Dr Marianela Cortes yesterday in the office- referred to ER for Cellulitis, IV ABX    Patient Active Problem List    Diagnosis Date Noted    Left leg cellulitis 2022    DM (diabetes mellitus) ([facility] [address] 10/26/2014    HTN (hypertension) 10/26/2014    Hypercholesterolemia 10/26/2014     Family History   Problem Relation Age of Onset    Stroke Mother     Other Mother         aneurysm    Hypertension Father     Cancer Father         Bone and Lung Cancer      Social History     Tobacco Use    Smoking status: Former Smoker     Packs/day: 1.00     Years: 20.00     Pack years: 20.00     Quit date: 2000     Years since quittin.9    Smokeless tobacco: Not on file   Substance Use Topics    Alcohol use: Yes     Comment: \"a couple of beers a day\"     Past Medical History:   Diagnosis Date    Diabetes ([facility] [address]     Hypercholesterolemia     Hypertension       Past Surgical History:   Procedure Laterality Date    HX CHOLECYSTECTOMY  2/3/15    Laparoscopic cholecystectomy    HX GI      colonoscopy    HX ORTHOPAEDIC      left wrist fx    NEUROLOGICAL PROCEDURE UNLISTED      Brain Surgery @ [facility] - hemorrhage       Prior to Admission medications    Medication Sig Start Date End Date Taking? Authorizing Provider   bisoprolol-hydrochlorothiazide [facility]) 5-6.25 mg per tablet Take 1 tablet by mouth daily. Yes Provider, Historical   rosuvastatin (CRESTOR) 20 mg tablet Take 20 mg by mouth nightly.    Yes Provider, Historical   lisinopril (PRINIVIL, ZESTRIL) 10 mg tablet Take  by mouth daily. Yes Provider, Historical   promethazine (PHENERGAN) 25 mg tablet Take 1 Tab by mouth every six (6) hours as needed. 7/25/15   Denisse Dangelo MD   oxyCODONE-acetaminophen (PERCOCET) 5-325 mg per tablet Take 1-2 tablets by mouth every four (4) hours as needed for Pain. 2/3/15   Nikita Malhotra MD   ondansetron (ZOFRAN ODT) 4 mg disintegrating tablet Take 1 tablet by mouth every eight (8) hours as needed for Nausea. 2/3/15   Nikita Mlahotra MD   nitroglycerin (NITROSTAT) 0.4 mg SL tablet by SubLINGual route every five (5) minutes as needed for Chest Pain. Provider, Historical   metformin (GLUCOPHAGE) 1,000 mg tablet Take 1,000 mg by mouth two (2) times daily (with meals). Provider, Historical   LIRAGLUTIDE (VICTOZA 2-STEPH SC) by SubCUTAneous route. Provider, Historical   dapagliflozin (FARXIGA) 5 mg tab Take  by mouth. Provider, Historical   omeprazole (PRILOSEC) 40 mg capsule Take 40 mg by mouth daily. Provider, Historical   aspirin delayed-release 81 mg tablet Take  by mouth daily. Provider, Historical     Current Facility-Administered Medications   Medication Dose Route Frequency    influenza vaccine 2021-22 (6 mos+)(PF) (FLUARIX/FLULAVAL/FLUZONE QUAD) injection 0.5 mL  1 Each IntraMUSCular PRIOR TO DISCHARGE    [START ON 1/21/2022] Vancomycin - please draw level prior to dose on 1/21 at 0800. Thank you!   1 Each Other ONCE    aspirin delayed-release tablet 81 mg  81 mg Oral DAILY    lisinopriL (PRINIVIL, ZESTRIL) tablet 10 mg  10 mg Oral DAILY    pantoprazole (PROTONIX) tablet 40 mg  40 mg Oral ACB    oxyCODONE-acetaminophen (PERCOCET) 5-325 mg per tablet 1 Tablet  1 Tablet Oral Q4H PRN    rosuvastatin (CRESTOR) tablet 20 mg  20 mg Oral QHS    sodium chloride (NS) flush 5-40 mL  5-40 mL IntraVENous Q8H    sodium chloride (NS) flush 5-40 mL  5-40 mL IntraVENous PRN    acetaminophen (TYLENOL) tablet 650 mg  650 mg Oral Q6H PRN    Or    acetaminophen (TYLENOL) suppository 650 mg  650 mg Rectal Q6H PRN    polyethylene glycol (MIRALAX) packet 17 g  17 g Oral DAILY PRN    ondansetron (ZOFRAN ODT) tablet 4 mg  4 mg Oral Q8H PRN    Or    ondansetron (ZOFRAN) injection 4 mg  4 mg IntraVENous Q6H PRN    enoxaparin (LOVENOX) injection 40 mg  40 mg SubCUTAneous DAILY    insulin lispro (HUMALOG) injection   SubCUTAneous AC&HS    glucose chewable tablet 16 g  4 Tablet Oral PRN    dextrose (D50W) injection syrg 12.5-25 g  12.5-25 g IntraVENous PRN    glucagon (GLUCAGEN) injection 1 mg  1 mg IntraMUSCular PRN    piperacillin-tazobactam (ZOSYN) 3.375 g in 0.9% sodium chloride (MBP/ADV) 100 mL MBP  3.375 g IntraVENous Q8H    vancomycin (VANCOCIN) 1250 mg in  ml infusion  1,250 mg IntraVENous Q18H    bisoprolol-hydroCHLOROthiazide (ZIAC) 5-6.25 mg per tablet 1 Tablet  1 Tablet Oral DAILY      No Known Allergies     Review of Systems:  A comprehensive review of systems was negative except for that written in the HPI. Mental Status: no dementia    Objective:     Patient Vitals for the past 8 hrs:   BP Temp Pulse Resp SpO2   22 0954 (!) 140/67 98 °F (36.7 °C) 76 17 96 %   22 0546 113/65 97.4 °F (36.3 °C) 67 16 95 %     Temp (24hrs), Av.8 °F (36.6 °C), Min:97.4 °F (36.3 °C), Max:98 °F (36.7 °C)      Gen: Well-developed,  in no acute distress   HEENT: Pink conjunctivae, hearing intact to voice, moist mucous membranes   Neck: Supple  Resp: No respiratory distress   Card: RRR, palpable distal pulse-equal bilaterally, birsk cap refill all distal digits   Abd:  non-distended  Musc: left foot and ankle with swelling and old ecchy. Black eschar about the dorsal lateral foot(approx 2.5-3CM w/surrounding erythema, hyper acute TTP. Intact plant/dorsiflexion/EHL/FHL, toe ROM limited by pain.   Neuro: Cranial nerves are grossly intact, no focal motor weakness, follows commands appropriately   Psych: Good insight, oriented to person, place and time, alert          Imaging Review:   EXAM: CT LOW EXT LT WO CONT  INDICATION: Left foot pain after crush injury   COMPARISON: None  CONTRAST: None. FINDINGS: Bones: Normal bone mineralization. No fracture, dislocation, or  periosteal reaction. Joint fluid: None. Articulations: Multifocal mild osteoarthritis. No septic arthritis. No erosion. Tendons: No full-thickness tendon tear. Muscles: No intramuscular hematoma. No focal atrophy. Soft tissue mass: None. Heterogeneous hematomas are in the subcutaneous adipose  tissues of the dorsal mid foot. IMPRESSION  1. Dorsal midfoot soft tissue contusions. 2. No fracture. Labs:   Recent Results (from the past 24 hour(s))   CBC WITH AUTOMATED DIFF    Collection Time: 01/19/22  3:55 PM   Result Value Ref Range    WBC 6.4 4.1 - 11.1 K/uL    RBC 4.34 4.10 - 5.70 M/uL    HGB 14.0 12.1 - 17.0 g/dL    HCT 42.3 36.6 - 50.3 %    MCV 97.5 80.0 - 99.0 FL    MCH 32.3 26.0 - 34.0 PG    MCHC 33.1 30.0 - 36.5 g/dL    RDW 13.5 11.5 - 14.5 %    PLATELET 938 (L) 900 - 400 K/uL    MPV 8.8 (L) 8.9 - 12.9 FL    NRBC 0.0 0  WBC    ABSOLUTE NRBC 0.00 0.00 - 0.01 K/uL    NEUTROPHILS 61 32 - 75 %    LYMPHOCYTES 26 12 - 49 %    MONOCYTES 11 5 - 13 %    EOSINOPHILS 1 0 - 7 %    BASOPHILS 0 0 - 1 %    IMMATURE GRANULOCYTES 1 (H) 0.0 - 0.5 %    ABS. NEUTROPHILS 3.9 1.8 - 8.0 K/UL    ABS. LYMPHOCYTES 1.7 0.8 - 3.5 K/UL    ABS. MONOCYTES 0.7 0.0 - 1.0 K/UL    ABS. EOSINOPHILS 0.1 0.0 - 0.4 K/UL    ABS. BASOPHILS 0.0 0.0 - 0.1 K/UL    ABS. IMM.  GRANS. 0.0 0.00 - 0.04 K/UL    DF AUTOMATED     METABOLIC PANEL, COMPREHENSIVE    Collection Time: 01/19/22  3:55 PM   Result Value Ref Range    Sodium 137 136 - 145 mmol/L    Potassium 4.4 3.5 - 5.1 mmol/L    Chloride 103 97 - 108 mmol/L    CO2 27 21 - 32 mmol/L    Anion gap 7 5 - 15 mmol/L    Glucose 135 (H) 65 - 100 mg/dL    BUN 19 6 - 20 MG/DL    Creatinine 1.48 (H) 0.70 - 1.30 MG/DL    BUN/Creatinine ratio 13 12 - 20      GFR est AA 57 (L) >60 ml/min/1.73m2    GFR est non-AA 47 (L) >60 ml/min/1.73m2    Calcium 9.3 8.5 - 10.1 MG/DL    Bilirubin, total 0.5 0.2 - 1.0 MG/DL    ALT (SGPT) 37 12 - 78 U/L    AST (SGOT) 28 15 - 37 U/L    Alk.  phosphatase 50 45 - 117 U/L    Protein, total 7.8 6.4 - 8.2 g/dL    Albumin 4.5 3.5 - 5.0 g/dL    Globulin 3.3 2.0 - 4.0 g/dL    A-G Ratio 1.4 1.1 - 2.2     CULTURE, BLOOD, PAIRED    Collection Time: 01/19/22  7:37 PM    Specimen: Blood   Result Value Ref Range    Special Requests: NO SPECIAL REQUESTS      Culture result: NO GROWTH AFTER 10 HOURS     GLUCOSE, POC    Collection Time: 01/19/22 10:37 PM   Result Value Ref Range    Glucose (POC) 157 (H) 65 - 117 mg/dL    Performed by Toula Phoenix (PCT)    METABOLIC PANEL, BASIC    Collection Time: 01/20/22  1:14 AM   Result Value Ref Range    Sodium 138 136 - 145 mmol/L    Potassium 3.7 3.5 - 5.1 mmol/L    Chloride 106 97 - 108 mmol/L    CO2 26 21 - 32 mmol/L    Anion gap 6 5 - 15 mmol/L    Glucose 139 (H) 65 - 100 mg/dL    BUN 20 6 - 20 MG/DL    Creatinine 1.06 0.70 - 1.30 MG/DL    BUN/Creatinine ratio 19 12 - 20      GFR est AA >60 >60 ml/min/1.73m2    GFR est non-AA >60 >60 ml/min/1.73m2    Calcium 8.7 8.5 - 10.1 MG/DL   CBC W/O DIFF    Collection Time: 01/20/22  1:14 AM   Result Value Ref Range    WBC 5.6 4.1 - 11.1 K/uL    RBC 3.90 (L) 4.10 - 5.70 M/uL    HGB 12.6 12.1 - 17.0 g/dL    HCT 37.5 36.6 - 50.3 %    MCV 96.2 80.0 - 99.0 FL    MCH 32.3 26.0 - 34.0 PG    MCHC 33.6 30.0 - 36.5 g/dL    RDW 13.5 11.5 - 14.5 %    PLATELET 180 (L) 956 - 400 K/uL    MPV 8.7 (L) 8.9 - 12.9 FL    NRBC 0.0 0  WBC    ABSOLUTE NRBC 0.00 0.00 - 0.01 K/uL         Impression:     Patient Active Problem List    Diagnosis Date Noted    Left leg cellulitis 01/19/2022    [facility] 10/26/2014    HTN (hypertension) 10/26/2014    Hypercholesterolemia 10/26/2014     Active Problems:    Left leg cellulitis (1/19/2022)        Plan:     -  Left foot crush injury with cellulitis   -  No surgical intervention at this time  -  Recommend aggressive elevation, rest  -  Continue IV ABX per hospitalist   -  DVT Prophylaxis - Lovenox    Will follow closely       Dr. Cristian Blank  aware and agrees with plan as above.         Ha Corona PA-C  [employer]

## 2022-01-20 NOTE — ED NOTES
TRANSFER - OUT REPORT:    Verbal report given to Pio(name) on [facility]  being transferred to [facility](unit) for routine progression of care       Report consisted of patients Situation, Background, Assessment and   Recommendations(SBAR). Information from the following report(s) SBAR, ED Summary, MAR and Accordion was reviewed with the receiving nurse. Lines:   Peripheral IV 01/19/22 Left;Posterior Forearm (Active)        Opportunity for questions and clarification was provided.       Patient transported with:   transport

## 2022-01-20 NOTE — PROGRESS NOTES
Hospitalist Progress Note    NAME: Ger Fagan   :  1955   MRN:  [id number]       Assessment / Plan:    Left foot cellulitis  Left foot crush injury with a snowplow blade  -No criteria for sepsis on admission   Failed oral antibiotics as an outpatient, he took Bactrim for a week  -Continue empiric antibiotics including vancomycin and Zosyn to cover for Pseudomonas  -Ortho consulted on admission   Blood cultures pending  -Keep left foot elevated. Pain control. -CT shows evidence of contusion but no abscess or fracture     Hypertension  Dyslipidemia  GERD  -Continue home bisoprolol, HCTZ, lisinopril, Crestor, PPI     Diabetes mellitus  -Holding home metformin and Victoza. Start insulin sliding scale with blood sugar checks    30.0 - 39.9 Obese / Body mass index is 30.38 kg/m². Estimated discharge date:   Barriers: clinical improvement     Code status: Full  Prophylaxis: Lovenox  Recommended Disposition: Home w/Family     Subjective:     Patient was seen and examined. No acute events overnight. Discussed with RN overnight events. All patient's questions were answered. \"Doing much better\"    Review of Systems:  Symptom Y/N Comments  Symptom Y/N Comments   Fever/Chills n   Chest Pain n    Poor Appetite    Edema     Cough n   Abdominal Pain n    Sputum    Joint Pain     SOB/SWAN n   Pruritis/Rash     Nausea/vomit n   Tolerating PT/OT     Diarrhea    Tolerating Diet y    Constipation    Other       Could NOT obtain due to:          Objective:     VITALS:   Last 24hrs VS reviewed since prior progress note.  Most recent are:  Patient Vitals for the past 24 hrs:   Temp Pulse Resp BP SpO2   22 0954 98 °F (36.7 °C) 76 17 (!) 140/67 96 %   22 0546 97.4 °F (36.3 °C) 67 16 113/65 95 %   22 2130 97.9 °F (36.6 °C) 77 16 113/69 96 %   22 1543 97.7 °F (36.5 °C) 74 16 131/68 95 %       Intake/Output Summary (Last 24 hours) at 2022 1202  Last data filed at 2022 4670  Gross per 24 hour   Intake 910 ml   Output    Net 910 ml        I had a face to face encounter and independently examined this patient on 1/20/2022, as outlined below:  PHYSICAL EXAM:  General: WD, WN. Alert, cooperative, no acute distress    EENT:  EOMI. Anicteric sclerae. MMM  Resp:  CTA bilaterally, no wheezing or rales. No accessory muscle use  CV:  Regular  rhythm,  No edema  GI:  Soft, Non distended, Non tender. +Bowel sounds  Neurologic:  Alert and oriented X 3, normal speech,   Psych:   Good insight. Not anxious nor agitated  Skin:  No rashes. No jaundice. Left foot wound around 2 cm x 1 cm, no drainage. Tenderness on touch his left foot, peripheral pulse +2 in the lower extremities    Reviewed most current lab test results and cultures  YES  Reviewed most current radiology test results   YES  Review and summation of old records today    NO  Reviewed patient's current orders and MAR    YES  PMH/ reviewed - no change compared to H&P  ________________________________________________________________________  Care Plan discussed with:    Comments   Patient x    Family      RN x    Care Manager     Consultant                        Multidiciplinary team rounds were held today with , nursing, pharmacist and clinical coordinator. Patient's plan of care was discussed; medications were reviewed and discharge planning was addressed. ________________________________________________________________________  Total NON critical care TIME36  Minutes    Total CRITICAL CARE TIME Spent:   Minutes non procedure based      Comments   >50% of visit spent in counseling and coordination of care     ________________________________________________________________________  Sergio Reyes MD     Procedures: see electronic medical records for all procedures/Xrays and details which were not copied into this note but were reviewed prior to creation of Plan.       LABS:  I reviewed today's most current labs and imaging studies.   Pertinent labs include:  Recent Labs     01/20/22  0114 01/19/22  1555   WBC 5.6 6.4   HGB 12.6 14.0   HCT 37.5 42.3   * 141*     Recent Labs     01/20/22  0114 01/19/22  1555    137   K 3.7 4.4    103   CO2 26 27   * 135*   BUN 20 19   CREA 1.06 1.48*   CA 8.7 9.3   ALB  --  4.5   TBILI  --  0.5   ALT  --  37       Signed: Hanna Herron MD

## 2022-01-20 NOTE — PROGRESS NOTES
Problem: Falls - Risk of  Goal: *Absence of Falls  Description: Document Maria Gamma Fall Risk and appropriate interventions in the flowsheet. Outcome: Progressing Towards Goal  Note: Fall Risk Interventions:            Medication Interventions: Patient to call before getting OOB,Teach patient to arise slowly                   Problem: Patient Education: Go to Patient Education Activity  Goal: Patient/Family Education  Outcome: Progressing Towards Goal     Problem: Pain  Goal: *Control of Pain  Outcome: Progressing Towards Goal     Problem: Patient Education: Go to Patient Education Activity  Goal: Patient/Family Education  Outcome: Progressing Towards Goal     Problem: General Wound Care  Goal: *Non-infected wound: Improvement of existing wound, absence of infection, and maintenance of skin integrity  Outcome: Progressing Towards Goal  Goal: *Infected Wound: Prevention of further infection and promotion of healing  Description: Infection control procedures (eg: clean dressings, clean gloves, hand washing, precautions to isolate wound from contamination, sterile instruments used for wound debridement) should be implemented.   Outcome: Progressing Towards Goal  Goal: Interventions  Outcome: Progressing Towards Goal

## 2022-01-20 NOTE — PROGRESS NOTES
Pharmacy Antimicrobial Kinetic Dosing    Indication for Antimicrobials: SSTI - cellulitis of left lower extremity     Current Regimen of Each Antimicrobial:  Zosyn 3.375 gm IV q8h (Start Date ; Day # 1)  Vancomycin 2000 mg IV once, then 1250 mg IV q18h (Start Date ; Day # 1)    Previous Antimicrobial Therapy:  Ceftriaxone 1 gm once     Goal Level: AUC: 400-600 mg/hr/Liter/day    Date Dose & Interval Measured (mcg/mL) Predicted AUC/ELIZABETH                       Date & time of next level:     Dosing calculator used: Mobile [employer] calculator    Significant Positive Cultures:     Conditions for Dosing Consideration: None    Labs:  Recent Labs     22  1555   CREA 1.48*   BUN      Recent Labs     22  1555   WBC 6.4     Temp (24hrs), Av.7 °F (36.5 °C), Min:97.7 °F (36.5 °C), Max:97.7 °F (36.5 °C)    Creatinine Clearance (mL/min):   CrCl (Ideal Body Weight): 50.9     Impression/Plan:   Zosyn dose adjusted to 3.375 gm IV q8h per renal dosing protocol  Vancomycin loading dose of 2000 mg, followed by 1250 mg IV every 18 hours (predicted , predicted trough 18.6 mcg/ml)  Antimicrobial stop date 7 days for Zosyn, TBD for vancomycin     Pharmacy will follow daily and adjust medications as appropriate for renal function and/or serum levels.     Thank you,  David Grijalva, PHARMD

## 2022-01-21 ENCOUNTER — APPOINTMENT (OUTPATIENT)
Dept: CT IMAGING | Age: 67
DRG: 638 | End: 2022-01-21
Attending: INTERNAL MEDICINE
Payer: MEDICARE

## 2022-01-21 LAB
GLUCOSE BLD STRIP.AUTO-MCNC: 148 MG/DL (ref 65–117)
GLUCOSE BLD STRIP.AUTO-MCNC: 152 MG/DL (ref 65–117)
GLUCOSE BLD STRIP.AUTO-MCNC: 175 MG/DL (ref 65–117)
GLUCOSE BLD STRIP.AUTO-MCNC: 226 MG/DL (ref 65–117)
SERVICE CMNT-IMP: ABNORMAL
VANCOMYCIN SERPL-MCNC: 4.6 UG/ML

## 2022-01-21 PROCEDURE — 74011000636 HC RX REV CODE- 636: Performed by: INTERNAL MEDICINE

## 2022-01-21 PROCEDURE — 36415 COLL VENOUS BLD VENIPUNCTURE: CPT

## 2022-01-21 PROCEDURE — 82962 GLUCOSE BLOOD TEST: CPT

## 2022-01-21 PROCEDURE — 73701 CT LOWER EXTREMITY W/DYE: CPT

## 2022-01-21 PROCEDURE — 80202 ASSAY OF VANCOMYCIN: CPT

## 2022-01-21 PROCEDURE — 74011000258 HC RX REV CODE- 258: Performed by: INTERNAL MEDICINE

## 2022-01-21 PROCEDURE — 74011250637 HC RX REV CODE- 250/637: Performed by: INTERNAL MEDICINE

## 2022-01-21 PROCEDURE — 74011000250 HC RX REV CODE- 250: Performed by: INTERNAL MEDICINE

## 2022-01-21 PROCEDURE — 74011250636 HC RX REV CODE- 250/636: Performed by: INTERNAL MEDICINE

## 2022-01-21 PROCEDURE — 65660000000 HC RM CCU STEPDOWN

## 2022-01-21 RX ORDER — VANCOMYCIN HYDROCHLORIDE
1250 EVERY 12 HOURS
Status: DISCONTINUED | OUTPATIENT
Start: 2022-01-22 | End: 2022-01-23

## 2022-01-21 RX ADMIN — ACETAMINOPHEN 650 MG: 325 TABLET ORAL at 16:46

## 2022-01-21 RX ADMIN — IOPAMIDOL 100 ML: 755 INJECTION, SOLUTION INTRAVENOUS at 11:30

## 2022-01-21 RX ADMIN — ACETAMINOPHEN 650 MG: 325 TABLET ORAL at 22:22

## 2022-01-21 RX ADMIN — PIPERACILLIN AND TAZOBACTAM 3.38 G: 3; .375 INJECTION, POWDER, LYOPHILIZED, FOR SOLUTION INTRAVENOUS at 16:46

## 2022-01-21 RX ADMIN — PANTOPRAZOLE SODIUM 40 MG: 40 TABLET, DELAYED RELEASE ORAL at 09:19

## 2022-01-21 RX ADMIN — SODIUM CHLORIDE, PRESERVATIVE FREE 10 ML: 5 INJECTION INTRAVENOUS at 06:20

## 2022-01-21 RX ADMIN — SODIUM CHLORIDE, PRESERVATIVE FREE 10 ML: 5 INJECTION INTRAVENOUS at 16:50

## 2022-01-21 RX ADMIN — LISINOPRIL 10 MG: 5 TABLET ORAL at 09:19

## 2022-01-21 RX ADMIN — ASPIRIN 81 MG: 81 TABLET, COATED ORAL at 09:19

## 2022-01-21 RX ADMIN — PIPERACILLIN AND TAZOBACTAM 3.38 G: 3; .375 INJECTION, POWDER, LYOPHILIZED, FOR SOLUTION INTRAVENOUS at 21:11

## 2022-01-21 RX ADMIN — SODIUM CHLORIDE, PRESERVATIVE FREE 10 ML: 5 INJECTION INTRAVENOUS at 21:15

## 2022-01-21 RX ADMIN — VANCOMYCIN HYDROCHLORIDE 1250 MG: 10 INJECTION, POWDER, LYOPHILIZED, FOR SOLUTION INTRAVENOUS at 16:08

## 2022-01-21 RX ADMIN — ACETAMINOPHEN 650 MG: 325 TABLET ORAL at 09:19

## 2022-01-21 RX ADMIN — ENOXAPARIN SODIUM 40 MG: 100 INJECTION SUBCUTANEOUS at 09:19

## 2022-01-21 RX ADMIN — PIPERACILLIN AND TAZOBACTAM 3.38 G: 3; .375 INJECTION, POWDER, LYOPHILIZED, FOR SOLUTION INTRAVENOUS at 06:20

## 2022-01-21 RX ADMIN — ROSUVASTATIN CALCIUM 20 MG: 10 TABLET, FILM COATED ORAL at 21:10

## 2022-01-21 NOTE — PROGRESS NOTES
Transition of Care Plan:    RUR:6%  Disposition:Home w/family  Follow up appointments:on AVS  DME needed:none  Transportation at [address] or means to access home:        IM Medicare Letter:2nd IM needed  Is patient a BCPI-A Bundle: n/a          If yes, was Bundle Letter given?:    Is patient a  and connected with the [place]? If yes, was [employer] transfer form completed and [facility] notified? Caregiver Contact:wife-Vane Edward Fat [phone]  Discharge Caregiver contacted prior to discharge? Reason for Admission:  Left foot cellulitis, Left foot crush injury with a snowplow blade                   RUR Score:    6%                 Plan for utilizing home health:   Not anticipated       PCP: First and Last name:  Val Hoskins NP     Name of Practice:    Are you a current patient: Yes/No: yes   Approximate date of last visit: 1/17/22   Can you participate in a virtual visit with your PCP:                     Current Advanced Directive/Advance Care Plan: Full Kenny Edwards (ACP) Conversation      Date of Conversation: 1/21/22  Conducted with: [facility]   No healthcare decision makers have been documented. Click here to complete [place] including selection of the Healthcare Decision Maker Relationship (ie \"Primary\")    Today we documented Decision Maker(s) consistent with Legal Next of Kin hierarchy. Content/Action Overview: Has ACP document(s) NOT on file - requested patient to provide    Length of Voluntary ACP Conversation in minutes:  <16 minutes (Non-Billable)    Lukas Brito                         Transition of Care Plan:     CM met with pt at bedside. Prior to admission, pt was independent w/ADL/IADL to include driving. No history of HH/Rehab or DME use. Patients support system includes, wife, brother in-law,friends & neighbors. PCP-  Cristhian Fletcher NP  Pharmacy-[facility] on [address]    Care Management Interventions  PCP Verified by CM: Yes  Mode of Transport at Discharge:  Other (see comment) (wife)  Transition of Care Consult (CM Consult): Discharge Planning  Discharge Durable Medical Equipment: No (no DME use)  Physical Therapy Consult: No  Occupational Therapy Consult: No  Speech Therapy Consult: No  Support Systems: Spouse/Significant Other,Other Family Member(s),Friend/Neighbor (Lives in a two story home, master bedroom on first fl, w/3 FRANK)  Confirm Follow Up Transport: Self  Discharge Location  Patient Expects to be Discharged to[de-identified] Home with outpatient services [facility])      Juan Luis Liner  Ext [phone]

## 2022-01-21 NOTE — PROGRESS NOTES
ORTHO - Progress Note      Evans Chin     [id number]  male    74 y.o.    1955    Admit date:2022        SUBJECTIVE:     Evans Chin is a 74 y.o. male resting in the bed. Patient has complaints of appropriate post-op pain, tolerating PO pain medications APAP. Denies F/C/flu like symptoms, nausea, vomiting, dizziness, lightheadedness, chest pain, or shortness of breath. OBJECTIVE:       Physical Exam:  General: alert, cooperative, no distress. Gastrointestinal:  non-distended . Cardiovascular:  Brisk cap refill in all distal extremities   Genitourinary: Voiding independently   Respiratory: No respiratory distress   Neurological:Neurovascular exam within normal limits. Senstion intact: LE bilat. Motor: + DF/PF/EHL. Musculoskeletal: Paris's sign negative in bilateral lower extremities. Calves soft, supple, non-tender upon palpation or with passive stretch. Left foot/ankle with decreased swelling overall, superficial veins now visible. circumferential tissue around eschar now less intensely red-- more purple     Vital Signs:       Patient Vitals for the past 8 hrs:   BP Temp Pulse Resp SpO2   22 0819 119/67 98.3 °F (36.8 °C) 70 16 99 %                                          Temp (24hrs), Av °F (36.7 °C), Min:97.8 °F (36.6 °C), Max:98.3 °F (36.8 °C)      Labs:        Recent Labs     22  0114   HCT 37.5   HGB 12.6     PT/OT:              ASSESSMENT / PLAN:   Active Problems:    Left leg cellulitis (2022)       Repeat CT w/ contrast today-      FINDINGS: Bones: Normal bone mineralization. No fracture, dislocation, or periosteal reaction.   Joint fluid: None.   Articulations: No significant osteoarthritis. No evidence of inflammatory arthritis.   Tendons: No full-thickness tendon tear.   Muscles: No intramuscular hematoma. No focal atrophy.   Soft soft tissues:  There is an area of soft tissue thickening dorsal to the  midfoot measuring 5.5 x 1.1 cm on series 5 image 223. The attenuation of this  measures 73 Hounsfield units which is near to that of the muscle. This likely  represents contusion. There is an overlying wound in the subcutaneous tissues measuring approximately 2.0 cm.   IMPRESSION  There is unchanged soft tissue thickening of the dorsal midfoot likely related  to hematoma. There is an overlying wound in the subcutaneous tissues. There is  no hypodense fluid collection and no rim enhancement. There is mild subcutaneous  edema that extends to just above the ankle. This has lessened in the interval.  No subcutaneous emphysema. - Recommend to continue IV antibiotics until cellulitis further improves. - Discharging on oral antibiotics(not bactrim)  -  follow up with the wound care center as out-pt  -  WBAT  - follow up with Dr Teri Stark in 1 week following discharge for a wound check.       Signed By: Carlee Carty PA-C

## 2022-01-21 NOTE — PROGRESS NOTES
Bedside shift change report given to Kane Clement RN (oncoming nurse) by Cony Mooney LPN (offgoing nurse). Report included the following information SBAR, Kardex, ED Summary, Intake/Output, MAR, Med Rec Status and Cardiac Rhythm NSR     Shift worked:  7p-730a      Shift summary and any significant changes:      Pt had uneventful night. Treated pain prn. Pt refused cardiac (tele) monitoring. Pt refused nighttime insulin coverage (teaching provided). Pt rested well and otherwise had no complaints.       Concerns for physician to address:        Zone phone for oncoming shift:            Activity:  Activity Level: Up ad danny  Number times ambulated in hallways past shift: 0  Number of times OOB to chair past shift: 0     Cardiac:   Cardiac Monitoring: Yes         Access:   Current line(s): PIV      Genitourinary:   Urinary status: voiding     Respiratory:   O2 Device: None (Room air)  Chronic home O2 use?: NO  Incentive spirometer at bedside: NO  GI:  Current diet:  ADULT DIET Regular; 4 carb choices (60 gm/meal)  Passing flatus: YES  Tolerating current diet: YES     Pain Management:   Patient states pain is manageable on current regimen: YES     Skin:  Shin Score: 20  Interventions: increase time out of bed    Patient Safety:  Fall Score:  Total Score: 1  Interventions: assistive device (walker, cane, etc) and stay with me (per policy)     Length of Stay:  Expected LOS: - - -  Actual LOS: 1        Cony Mooney LPN

## 2022-01-21 NOTE — PROGRESS NOTES
Hospitalist Progress Note    NAME: Paola Taylro   :  1955   MRN:  [id number]       Assessment / Plan:    Left foot cellulitis  Left foot crush injury with a snowplow blade  -No criteria for sepsis on admission   Failed oral antibiotics as an outpatient, he took Bactrim for a week  -Continue empiric antibiotics including vancomycin and Zosyn to cover for Pseudomonas  -Ortho consulted on admission, input is appreciated   Blood cultures negative to date  -Keep left foot elevated. Pain control. -CT on admission shows evidence of contusion but no abscess or fracture   Clinically his left foot looks worse today, patient cannot have MRI because he had previous clips for brain aneurysm. We will repeat CT scan     Hypertension  Dyslipidemia  GERD  -Continue home bisoprolol, HCTZ, lisinopril, Crestor, PPI     Diabetes mellitus  -Holding home metformin and Victoza. Start insulin sliding scale with blood sugar checks    30.0 - 39.9 Obese / Body mass index is 30.38 kg/m². Estimated discharge date:   Barriers: clinical improvement     Code status: Full  Prophylaxis: Lovenox  Recommended Disposition: Home w/Family     Subjective:     Patient was seen and examined. No acute events overnight. Discussed with RN overnight events. All patient's questions were answered. I talked to patient's wife over the phone, all questions were answered. \"Feeling okay\"    Review of Systems:  Symptom Y/N Comments  Symptom Y/N Comments   Fever/Chills n   Chest Pain n    Poor Appetite    Edema     Cough n   Abdominal Pain n    Sputum    Joint Pain     SOB/SWAN n   Pruritis/Rash     Nausea/vomit n   Tolerating PT/OT     Diarrhea    Tolerating Diet y    Constipation    Other       Could NOT obtain due to:          Objective:     VITALS:   Last 24hrs VS reviewed since prior progress note.  Most recent are:  Patient Vitals for the past 24 hrs:   Temp Pulse Resp BP SpO2   22 0819 98.3 °F (36.8 °C) 70 16 119/67 99 %   01/21/22 0341 97.8 °F (36.6 °C) 76 16 136/80 98 %   01/20/22 2005 98 °F (36.7 °C) 71 16 113/62 95 %   01/20/22 1535 98 °F (36.7 °C) 73 17 (!) 89/45 95 %   01/20/22 1256 97.8 °F (36.6 °C) 71 17 118/71 96 %     No intake or output data in the 24 hours ending 01/21/22 1151     I had a face to face encounter and independently examined this patient on 1/21/2022, as outlined below:  PHYSICAL EXAM:  General: WD, WN. Alert, cooperative, no acute distress    EENT:  EOMI. Anicteric sclerae. MMM  Resp:  CTA bilaterally, no wheezing or rales. No accessory muscle use  CV:  Regular  rhythm,  No edema  GI:  Soft, Non distended, Non tender. +Bowel sounds  Neurologic:  Alert and oriented X 3, normal speech,   Psych:   Good insight. Not anxious nor agitated  Skin:  No rashes. No jaundice. Left foot wound around 2 cm x 1 cm, no drainage. Tenderness on touch his left foot which is worse than yesterday, peripheral pulse +2 in the lower extremities    Reviewed most current lab test results and cultures  YES  Reviewed most current radiology test results   YES  Review and summation of old records today    NO  Reviewed patient's current orders and MAR    YES  PMH/ reviewed - no change compared to H&P  ________________________________________________________________________  Care Plan discussed with:    Comments   Patient x    Family  X    RN x    Care Manager     Consultant  X  orthopedic surgery                     Multidiciplinary team rounds were held today with , nursing, pharmacist and clinical coordinator. Patient's plan of care was discussed; medications were reviewed and discharge planning was addressed.      ________________________________________________________________________  Total NON critical care TIME36  Minutes    Total CRITICAL CARE TIME Spent:   Minutes non procedure based      Comments   >50% of visit spent in counseling and coordination of care ________________________________________________________________________  Suhas Singh MD     Procedures: see electronic medical records for all procedures/Xrays and details which were not copied into this note but were reviewed prior to creation of Plan. LABS:  I reviewed today's most current labs and imaging studies.   Pertinent labs include:  Recent Labs     01/20/22  0114 01/19/22  1555   WBC 5.6 6.4   HGB 12.6 14.0   HCT 37.5 42.3   * 141*     Recent Labs     01/20/22  0114 01/19/22  1555    137   K 3.7 4.4    103   CO2 26 27   * 135*   BUN 20 19   CREA 1.06 1.48*   CA 8.7 9.3   ALB  --  4.5   TBILI  --  0.5   ALT  --  37       Signed: Suhas Singh MD

## 2022-01-21 NOTE — PROGRESS NOTES
Pharmacy Antimicrobial Kinetic Dosing    Indication for Antimicrobials: SSTI - cellulitis of left lower extremity     Current Regimen of Each Antimicrobial:  Zosyn 3.375 gm IV q8h (Start Date ; Day # 2)  Vancomycin, pharmacy to dose (Start Date ; Day # 3)    Previous Antimicrobial Therapy:  Ceftriaxone 1 gm once     Goal Level: AUC: 400-600 mg/hr/Liter/day    Date Dose & Interval Measured (mcg/mL) Predicted AUC/ELIZABETH    1057 1250mg IV q18h 4.6 333                 Date & time of next level:     Dosing calculator used: [employer] calculator    Significant Positive Cultures:    pbcx - ngsf (pending)    Conditions for Dosing Consideration: None    Labs:  Recent Labs     22  0114 22  1555   CREA 1.06 1.48*   BUN      Recent Labs     22  0114 22  1555   WBC 5.6 6.4     Temp (24hrs), Av °F (36.7 °C), Min:97.8 °F (36.6 °C), Max:98.3 °F (36.8 °C)    Creatinine Clearance (mL/min):   CrCl (Ideal Body Weight): 71.0    Impression/Plan:   Continue Zosyn as ordered  Scr has improved -- unclear baseline. BMP not drawn today. Per notes, foot looks worse. With RL today, vancomycin is subtherapeutic. Will increase to vancomycin 1250mg IV q12h to give an estimated . BMP daily  Antimicrobial stop date 7 days for Zosyn, TBD for vancomycin     Pharmacy will follow daily and adjust medications as appropriate for renal function and/or serum levels.     Thank you,  Gentry Tejada, PHARMD

## 2022-01-22 LAB
ANION GAP SERPL CALC-SCNC: 5 MMOL/L (ref 5–15)
BUN SERPL-MCNC: 12 MG/DL (ref 6–20)
BUN/CREAT SERPL: 14 (ref 12–20)
CALCIUM SERPL-MCNC: 8.9 MG/DL (ref 8.5–10.1)
CHLORIDE SERPL-SCNC: 107 MMOL/L (ref 97–108)
CO2 SERPL-SCNC: 27 MMOL/L (ref 21–32)
COMMENT, HOLDF: NORMAL
CREAT SERPL-MCNC: 0.87 MG/DL (ref 0.7–1.3)
ERYTHROCYTE [DISTWIDTH] IN BLOOD BY AUTOMATED COUNT: 13.4 % (ref 11.5–14.5)
GLUCOSE BLD STRIP.AUTO-MCNC: 129 MG/DL (ref 65–117)
GLUCOSE BLD STRIP.AUTO-MCNC: 162 MG/DL (ref 65–117)
GLUCOSE BLD STRIP.AUTO-MCNC: 229 MG/DL (ref 65–117)
GLUCOSE BLD STRIP.AUTO-MCNC: 265 MG/DL (ref 65–117)
GLUCOSE SERPL-MCNC: 171 MG/DL (ref 65–100)
HCT VFR BLD AUTO: 39.5 % (ref 36.6–50.3)
HGB BLD-MCNC: 13 G/DL (ref 12.1–17)
MCH RBC QN AUTO: 32.3 PG (ref 26–34)
MCHC RBC AUTO-ENTMCNC: 32.9 G/DL (ref 30–36.5)
MCV RBC AUTO: 98 FL (ref 80–99)
NRBC # BLD: 0 K/UL (ref 0–0.01)
NRBC BLD-RTO: 0 PER 100 WBC
PLATELET # BLD AUTO: 148 K/UL (ref 150–400)
PMV BLD AUTO: 8.8 FL (ref 8.9–12.9)
POTASSIUM SERPL-SCNC: 4 MMOL/L (ref 3.5–5.1)
RBC # BLD AUTO: 4.03 M/UL (ref 4.1–5.7)
SAMPLES BEING HELD,HOLD: NORMAL
SERVICE CMNT-IMP: ABNORMAL
SODIUM SERPL-SCNC: 139 MMOL/L (ref 136–145)
WBC # BLD AUTO: 5 K/UL (ref 4.1–11.1)

## 2022-01-22 PROCEDURE — 74011000250 HC RX REV CODE- 250: Performed by: INTERNAL MEDICINE

## 2022-01-22 PROCEDURE — 36415 COLL VENOUS BLD VENIPUNCTURE: CPT

## 2022-01-22 PROCEDURE — 74011250636 HC RX REV CODE- 250/636: Performed by: INTERNAL MEDICINE

## 2022-01-22 PROCEDURE — 85027 COMPLETE CBC AUTOMATED: CPT

## 2022-01-22 PROCEDURE — 74011000258 HC RX REV CODE- 258: Performed by: INTERNAL MEDICINE

## 2022-01-22 PROCEDURE — 82962 GLUCOSE BLOOD TEST: CPT

## 2022-01-22 PROCEDURE — 74011250637 HC RX REV CODE- 250/637: Performed by: INTERNAL MEDICINE

## 2022-01-22 PROCEDURE — 65660000000 HC RM CCU STEPDOWN

## 2022-01-22 PROCEDURE — 80048 BASIC METABOLIC PNL TOTAL CA: CPT

## 2022-01-22 PROCEDURE — 74011636637 HC RX REV CODE- 636/637: Performed by: INTERNAL MEDICINE

## 2022-01-22 RX ADMIN — PIPERACILLIN AND TAZOBACTAM 3.38 G: 3; .375 INJECTION, POWDER, LYOPHILIZED, FOR SOLUTION INTRAVENOUS at 22:17

## 2022-01-22 RX ADMIN — SODIUM CHLORIDE, PRESERVATIVE FREE 10 ML: 5 INJECTION INTRAVENOUS at 22:17

## 2022-01-22 RX ADMIN — SODIUM CHLORIDE, PRESERVATIVE FREE 10 ML: 5 INJECTION INTRAVENOUS at 06:45

## 2022-01-22 RX ADMIN — ROSUVASTATIN CALCIUM 20 MG: 10 TABLET, FILM COATED ORAL at 22:00

## 2022-01-22 RX ADMIN — ASPIRIN 81 MG: 81 TABLET, COATED ORAL at 09:25

## 2022-01-22 RX ADMIN — Medication 3 UNITS: at 11:30

## 2022-01-22 RX ADMIN — ACETAMINOPHEN 650 MG: 325 TABLET ORAL at 15:11

## 2022-01-22 RX ADMIN — VANCOMYCIN HYDROCHLORIDE 1250 MG: 5 INJECTION, POWDER, LYOPHILIZED, FOR SOLUTION INTRAVENOUS at 17:25

## 2022-01-22 RX ADMIN — VANCOMYCIN HYDROCHLORIDE 1250 MG: 5 INJECTION, POWDER, LYOPHILIZED, FOR SOLUTION INTRAVENOUS at 04:00

## 2022-01-22 RX ADMIN — PIPERACILLIN AND TAZOBACTAM 3.38 G: 3; .375 INJECTION, POWDER, LYOPHILIZED, FOR SOLUTION INTRAVENOUS at 14:07

## 2022-01-22 RX ADMIN — PIPERACILLIN AND TAZOBACTAM 3.38 G: 3; .375 INJECTION, POWDER, LYOPHILIZED, FOR SOLUTION INTRAVENOUS at 06:45

## 2022-01-22 RX ADMIN — Medication 3 UNITS: at 22:16

## 2022-01-22 RX ADMIN — ACETAMINOPHEN 650 MG: 325 TABLET ORAL at 22:16

## 2022-01-22 RX ADMIN — SODIUM CHLORIDE, PRESERVATIVE FREE 10 ML: 5 INJECTION INTRAVENOUS at 14:14

## 2022-01-22 RX ADMIN — ENOXAPARIN SODIUM 40 MG: 100 INJECTION SUBCUTANEOUS at 09:26

## 2022-01-22 RX ADMIN — PANTOPRAZOLE SODIUM 40 MG: 40 TABLET, DELAYED RELEASE ORAL at 06:45

## 2022-01-22 RX ADMIN — ACETAMINOPHEN 650 MG: 325 TABLET ORAL at 09:26

## 2022-01-22 RX ADMIN — LISINOPRIL 10 MG: 5 TABLET ORAL at 09:25

## 2022-01-22 NOTE — PROGRESS NOTES
ORTHO - Progress Note      Minnie Torrez     [id number]  male    74 y.o.    1955    Admit date:2022      SUBJECTIVE:     Minnie Torrez is a 74 y.o. male resting in the bed. Patient has complaints of minimal pain, tolerating PO pain medications-- APAP. Notes persistent pain when WB/ambulating on the ball of the foot. Denies F/C/flu like symptoms, nausea, vomiting, dizziness, lightheadedness, chest pain, or shortness of breath. OBJECTIVE:       Physical Exam:  General: alert, cooperative, no distress. Gastrointestinal:  non-distended . Cardiovascular: equal pulses in the lower extremities,  Brisk cap refill in all distal extremities   Genitourinary: Voiding independently   Respiratory: No respiratory distress   Neurological:Neurovascular exam within normal limits. Senstion intact: LE bilat. Motor: + DF/PF/EHL. Musculoskeletal: Paris's sign negative in bilateral lower extremities. Calves soft, supple, non-tender upon palpation or with passive stretch. LLE decreased swelling overall especially around the ankle and toes,   superficial veins now visible. circumferential tissue around eschar less red--trending purple,   minimal TTP medial aspect/ sig ttp lateral aspect.     Intact flex/ext of the toes(+pain with 4/5 toe A/P ROM)       Vital Signs:       Patient Vitals for the past 8 hrs:   BP Temp Pulse Resp SpO2   22 1140 126/67 98 °F (36.7 °C) 80 17 95 %   22 0754 114/61 97.6 °F (36.4 °C) 64 17 96 %                                          Temp (24hrs), Av °F (36.7 °C), Min:97.6 °F (36.4 °C), Max:98.8 °F (37.1 °C)      Labs:        Recent Labs     22  0346   HCT 39.5   HGB 13.0     PT/OT:              ASSESSMENT / PLAN:   Active Problems:    Left leg cellulitis (2022)       -  BC remain neg  -  Continue aggressive rest and elevation  -  Recommend continued IV antibiotics   -  Follow up with the wound care center as out-pt(hilario dolan/ Dr Samara Galloway)  - Discharging on oral antibiotics(not bactrim)  Sunday v/s Monday(per hospitalist)  -  WBAT  -  follow up with Dr Milka Gao in 1 week following discharge for a wound check.     Signed By: Vijay Johnston PA-C

## 2022-01-23 VITALS
HEIGHT: 71 IN | SYSTOLIC BLOOD PRESSURE: 130 MMHG | DIASTOLIC BLOOD PRESSURE: 81 MMHG | TEMPERATURE: 98.2 F | RESPIRATION RATE: 16 BRPM | BODY MASS INDEX: 30.49 KG/M2 | WEIGHT: 217.81 LBS | OXYGEN SATURATION: 92 % | HEART RATE: 72 BPM

## 2022-01-23 LAB
ANION GAP SERPL CALC-SCNC: 5 MMOL/L (ref 5–15)
BUN SERPL-MCNC: 11 MG/DL (ref 6–20)
BUN/CREAT SERPL: 16 (ref 12–20)
CALCIUM SERPL-MCNC: 8.8 MG/DL (ref 8.5–10.1)
CHLORIDE SERPL-SCNC: 108 MMOL/L (ref 97–108)
CO2 SERPL-SCNC: 26 MMOL/L (ref 21–32)
CREAT SERPL-MCNC: 0.7 MG/DL (ref 0.7–1.3)
DATE LAST DOSE: NORMAL
GLUCOSE BLD STRIP.AUTO-MCNC: 150 MG/DL (ref 65–117)
GLUCOSE BLD STRIP.AUTO-MCNC: 152 MG/DL (ref 65–117)
GLUCOSE SERPL-MCNC: 153 MG/DL (ref 65–100)
POTASSIUM SERPL-SCNC: 4 MMOL/L (ref 3.5–5.1)
REPORTED DOSE,DOSE: NORMAL UNITS
REPORTED DOSE/TIME,TMG: 1725
SERVICE CMNT-IMP: ABNORMAL
SERVICE CMNT-IMP: ABNORMAL
SODIUM SERPL-SCNC: 139 MMOL/L (ref 136–145)
VANCOMYCIN TROUGH SERPL-MCNC: 6.6 UG/ML (ref 5–10)

## 2022-01-23 PROCEDURE — 74011250637 HC RX REV CODE- 250/637: Performed by: INTERNAL MEDICINE

## 2022-01-23 PROCEDURE — 36415 COLL VENOUS BLD VENIPUNCTURE: CPT

## 2022-01-23 PROCEDURE — 74011000258 HC RX REV CODE- 258: Performed by: INTERNAL MEDICINE

## 2022-01-23 PROCEDURE — 74011250636 HC RX REV CODE- 250/636: Performed by: INTERNAL MEDICINE

## 2022-01-23 PROCEDURE — 80202 ASSAY OF VANCOMYCIN: CPT

## 2022-01-23 PROCEDURE — 82962 GLUCOSE BLOOD TEST: CPT

## 2022-01-23 PROCEDURE — 74011000250 HC RX REV CODE- 250: Performed by: INTERNAL MEDICINE

## 2022-01-23 PROCEDURE — 74011636637 HC RX REV CODE- 636/637: Performed by: INTERNAL MEDICINE

## 2022-01-23 PROCEDURE — 80048 BASIC METABOLIC PNL TOTAL CA: CPT

## 2022-01-23 RX ORDER — CHOLECALCIFEROL (VITAMIN D3) 125 MCG
5 CAPSULE ORAL
COMMUNITY

## 2022-01-23 RX ORDER — CEPHALEXIN 500 MG/1
500 CAPSULE ORAL 4 TIMES DAILY
Qty: 28 CAPSULE | Refills: 0 | Status: SHIPPED | OUTPATIENT
Start: 2022-01-23 | End: 2022-01-30

## 2022-01-23 RX ORDER — ENALAPRIL MALEATE 2.5 MG/1
2.5 TABLET ORAL DAILY
COMMUNITY
End: 2022-01-23

## 2022-01-23 RX ORDER — DOXYCYCLINE 100 MG/1
100 TABLET ORAL 2 TIMES DAILY
Qty: 14 TABLET | Refills: 0 | Status: SHIPPED | OUTPATIENT
Start: 2022-01-23 | End: 2022-01-30

## 2022-01-23 RX ORDER — ASCORBIC ACID 500 MG
500 TABLET ORAL
COMMUNITY

## 2022-01-23 RX ORDER — ICOSAPENT ETHYL 1000 MG/1
1 CAPSULE ORAL 2 TIMES DAILY WITH MEALS
COMMUNITY

## 2022-01-23 RX ORDER — VANCOMYCIN HYDROCHLORIDE
1250 EVERY 8 HOURS
Status: DISCONTINUED | OUTPATIENT
Start: 2022-01-23 | End: 2022-01-23 | Stop reason: HOSPADM

## 2022-01-23 RX ADMIN — ENOXAPARIN SODIUM 40 MG: 100 INJECTION SUBCUTANEOUS at 08:26

## 2022-01-23 RX ADMIN — LISINOPRIL 10 MG: 5 TABLET ORAL at 08:25

## 2022-01-23 RX ADMIN — ASPIRIN 81 MG: 81 TABLET, COATED ORAL at 08:25

## 2022-01-23 RX ADMIN — VANCOMYCIN HYDROCHLORIDE 1250 MG: 5 INJECTION, POWDER, LYOPHILIZED, FOR SOLUTION INTRAVENOUS at 06:19

## 2022-01-23 RX ADMIN — PANTOPRAZOLE SODIUM 40 MG: 40 TABLET, DELAYED RELEASE ORAL at 06:19

## 2022-01-23 RX ADMIN — ACETAMINOPHEN 650 MG: 325 TABLET ORAL at 06:27

## 2022-01-23 RX ADMIN — PIPERACILLIN AND TAZOBACTAM 3.38 G: 3; .375 INJECTION, POWDER, LYOPHILIZED, FOR SOLUTION INTRAVENOUS at 05:02

## 2022-01-23 RX ADMIN — SODIUM CHLORIDE, PRESERVATIVE FREE 10 ML: 5 INJECTION INTRAVENOUS at 05:03

## 2022-01-23 RX ADMIN — BISOPROLOL FUMARATE AND HYDROCHLOROTHIAZIDE 1 TABLET: 6.25; 5 TABLET ORAL at 08:26

## 2022-01-23 RX ADMIN — Medication 2 UNITS: at 08:25

## 2022-01-23 NOTE — PROGRESS NOTES
Transition of Care Plan:    RUR: 7%  Disposition: Home with family support  Follow up appointments: Follow up with PCP and/or Specialist   DME needed: N/A  Transportation at Discharge: Family will assist with transport home   [place] or means to access home:    Family to provide     IM Medicare Letter: 2nd IM Medicare Letter to be given  Is patient a BCPI-A Bundle:  CM to provide if require          If yes, was Bundle Letter given?:    Is patient a [place] and connected with the [place]? N/A  If yes, was [employer] transfer form completed and [facility] notified? Caregiver Contact: Ashli Rodriguez (spouse) [phone]  Discharge Caregiver contacted prior to discharge? Family to be contacted    Reason for Admission:  Cellulitis of left lower extremity                      RUR Score:     7%                Plan for utilizing home health:   N/A       PCP: First and Last name:  Ghanshyam Vásquez NP     Name of Practice:    Are you a current patient: Yes/No: Yes    Approximate date of last visit:  Jan 2022    Can you participate in a virtual visit with your PCP: Yes, with family support                     Current Advanced Directive/Advance Care Plan: Full Code      Cara (ACP) Conversation      Date of Conversation: 1/23/22  Conducted with: Patient with Decision Making Capacity    Healthcare Decision Maker:   No healthcare decision makers have been documented. Click here to complete [address] including selection of the Healthcare Decision Maker Relationship (ie \"Primary\")    Today we documented Decision Maker(s) consistent with Legal Next of Kin hierarchy.     Content/Action Overview:   DECLINED ACP conversation - will revisit periodically   Reviewed DNR/DNI and patient elects Full Code (Attempt Resuscitation)      Length of Voluntary ACP Conversation in minutes:  16 minutes    Leatha Larios                     Transition of Care Plan:                      CM completed room assessment with pt. Pt is known to reside with spouse in their one story home. Pt is known to be active with PCP: Jan 2022, and use [facility] Pt is known to be independent with ADLs, and drives. Pts family will assist with transport at the time of d/c. Pt reported no DME, HHC and SNF. CM will continue to follow. Care Management Interventions  PCP Verified by CM: Yes  Mode of Transport at Discharge:  Other (see comment)  Transition of Care Consult (CM Consult): Discharge Planning  Discharge Durable Medical Equipment: No  Physical Therapy Consult: No  Occupational Therapy Consult: No  Speech Therapy Consult: No  Support Systems: Spouse/Significant Other  Confirm Follow Up Transport: Family  Discharge Location  Patient Expects to be Discharged to[de-identified] [facility] [address], [address], [facility]

## 2022-01-23 NOTE — PROGRESS NOTES
DISCHARGE NOTE FROM [facility] NURSE    Patient determined to be stable for discharge by attending provider. I have reviewed the discharge instructions and follow-up appointments with the patient. They verbalized understanding and all questions were answered to their satisfaction. No complaints or further questions were expressed. Medications sent to pharmacy. Appropriate educational materials and medication side effect teaching were provided. PIV were removed prior to discharge. Patient did not discharge with any line, cerrato, or drain. All personal items collected during admission were returned to the patient prior to discharge.     Post-op patient: Indu Jain RN

## 2022-01-23 NOTE — PROGRESS NOTES
ORTHO - Progress Note    Prasad Pacheco     [id number]  male    74 y.o.    1955    Admit date:2022      SUBJECTIVE:     Prasad Pacheco is a 74 y.o. male standing up, dressed preparing for DC. Patient has complaints of minima left foot pain. Denies F/C, nausea, vomiting, dizziness, lightheadedness, chest pain, or shortness of breath. OBJECTIVE:       Physical Exam:  General: alert, cooperative, no distress. Gastrointestinal:  non-distended . Cardiovascular: equal pulses in the lower extremities,  Brisk cap refill in all distal extremities   Genitourinary: Voiding independently   Respiratory: No respiratory distress   Neurological:Neurovascular exam within normal limits. Senstion intact: LE bilat. Motor: + DF/PF/EHL. Musculoskeletal: Paris's sign negative in bilateral lower extremities. Calves soft, supple, non-tender upon palpation or with passive stretch. LLE decreased swelling overall especially around the ankle and toes,   superficial veins now visible. circumferential tissue around eschar now purple--trending light fuchsia  minimal TTP medial aspect/ sig ttp lateral aspect.     Intact flex/ext of the toes(minimal discomfort with 4/5 toe A/P ROM)            Vital Signs:       Patient Vitals for the past 8 hrs:   BP Temp Pulse Resp SpO2   22 1235 130/81 98.2 °F (36.8 °C) 72 16 92 %   22 0820 127/77 97.6 °F (36.4 °C) 78  98 %                                          Temp (24hrs), Av.1 °F (36.7 °C), Min:97.6 °F (36.4 °C), Max:98.7 °F (37.1 °C)      Labs:        Recent Labs     22  0346   HCT 39.5   HGB 13.0     PT/OT:              ASSESSMENT / PLAN:   Active Problems:    Left leg cellulitis (2022)       -  BC remain neg  -  Continue aggressive rest and elevation  -  Recommend continued IV antibiotics   -  Follow up with the wound care center as out-pt(hilario dolan/ Dr Ebonie Olea)  -  Discharging on oral antibiotics(not bactrim)  likely today   -  WBAT  - follow up with Dr Rylie Noriega in 1 week following discharge for a wound check.       Signed By: Kyung Selby PA-C

## 2022-01-23 NOTE — PROGRESS NOTES
Pharmacy Antimicrobial Kinetic Dosing    Indication for Antimicrobials: SSTI - cellulitis of left lower extremity     Current Regimen of Each Antimicrobial:  Zosyn 3.375 gm IV q8h (Start Date ; Day # 4)  Vancomycin 1250 mg IV q12h (Start Date ; Day # 5)    Previous Antimicrobial Therapy:  Ceftriaxone 1 g x 1 (Start Date )    Goal Level: AUC: 400-600 mg/hr/Liter/day    Date Dose & Interval Measured (mcg/mL) Predicted AUC/ELIZABETH    1057 1250mg IV q18h 4.6 333    @ 0449 1250 mg IV q12h 6.6 314           Date & time of next level: N/A    Dosing calculator used: [employer] calculator    Significant Positive Cultures:    blood: NGTD, pending    Conditions for Dosing Consideration: None    Labs:  Recent Labs     22  0449 22  0346   CREA 0.70 0.87   BUN 11 12     Recent Labs     22  0346   WBC 5.0     Temp (24hrs), Av.1 °F (36.7 °C), Min:97.6 °F (36.4 °C), Max:98.7 °F (37.1 °C)    Creatinine Clearance (mL/min):   CrCl (Ideal Body Weight): 107.6    Impression/Plan:   Vancomycin random level resulted below goal (10-15) and patient's Scr continues to improve. Thus, will adjust vancomycin to 1250 mg IV q8h for a predicted AUC of 470   Continue Zosyn as ordered  Daily BMP ordered  Antimicrobial stop date: 7 days (Zosyn), TBD for vancomycin     Pharmacy will follow daily and adjust medications as appropriate for renal function and/or serum levels.     Thank you,  JHON Whelan

## 2022-01-23 NOTE — PROGRESS NOTES
End of Shift Note    Bedside shift change report given to [address]  (oncoming nurse) by Cortez Reese (offgoing nurse). Report included the following information SBAR, Kardex, Intake/Output, MAR and Recent Results    Shift worked: [id number]     Shift summary and any significant changes:     Patient up ad danny and  tolerating activity well. Patient given Tylenol for mild  pain to L foot with relief. Expected discharge home today. Concerns for physician to address:      Zone phone for oncoming shift:  [phone]       Activity:  Activity Level: Up ad danny  Number times ambulated in hallways past shift: 0  Number of times OOB to chair past shift: 1    Cardiac:   Cardiac Monitoring: No      Cardiac Rhythm:  (Refused telemetry monitor)    Access:   Current line(s): PIV     Genitourinary:   Urinary status: voiding    Respiratory:   O2 Device: None (Room air)  Chronic home O2 use?: N/A  Incentive spirometer at bedside: NO     GI:  Last Bowel Movement Date: 01/21/22  Current diet:  ADULT DIET Regular; 4 carb choices (60 gm/meal)  Passing flatus: YES  Tolerating current diet: YES       Pain Management:   Patient states pain is manageable on current regimen: YES    Skin:  Shin Score: 23  Interventions: increase time out of bed and nutritional support     Patient Safety:  Fall Score:  Total Score: 2  Interventions: assistive device (walker, cane, etc) and gripper socks       Length of Stay:  Expected LOS: 3d 4h  Actual LOS: [address] LPN

## 2022-01-23 NOTE — PROGRESS NOTES
End of Shift Note    Bedside shift change report given to ESTEFANY Pacheco (oncoming nurse) by Ricky Ortiz RN (offgoing nurse). Report included the following information SBAR, Kardex, Intake/Output and MAR    Shift worked:  7am-7pm     Shift summary and any significant changes:     Pt ambulated to the bathroom multiple times during the shift and tolerated the activity. Pt complained of pain twice during the shift and tylenol was given. This seemed to alleviate the pain. Pt is tolerating the current diet. Pt had an uneventful shift. Concerns for physician to address:       Zone phone for oncoming shift:   [phone]       Activity:  Activity Level: Up ad danny  Number times ambulated in hallways past shift: 0  Number of times OOB to chair past shift: 0    Cardiac:   Cardiac Monitoring: No      Cardiac Rhythm:  (Refused telemetry monitor)    Access:   Current line(s): PIV     Genitourinary:   Urinary status: voiding    Respiratory:   O2 Device: None (Room air)  Chronic home O2 use?: NO  Incentive spirometer at bedside: YES     GI:  Last Bowel Movement Date: 01/21/22  Current diet:  ADULT DIET Regular; 4 carb choices (60 gm/meal)  Passing flatus: YES  Tolerating current diet: YES       Pain Management:   Patient states pain is manageable on current regimen: YES    Skin:  Shin Score: 23  Interventions: float heels and increase time out of bed    Patient Safety:  Fall Score:  Total Score: 2  Interventions: gripper socks and pt to call before getting OOB       Length of Stay:  Expected LOS: 3d 4h  Actual LOS: [address], RN

## 2022-01-23 NOTE — PROGRESS NOTES
Hospitalist Progress Note    NAME: Ezequiel Bull   :  1955   MRN:  [id number]       Assessment / Plan:  Left foot cellulitis  Left foot crush injury with a snowplow blade  -No criteria for sepsis on admission   Failed oral antibiotics as an outpatient, he took Bactrim for a week  -Continue empiric antibiotics including vancomycin and Zosyn to cover for Pseudomonas  -Ortho following    Blood cultures negative to date  -Keep left foot elevated. Pain control. -CT on admission shows evidence of contusion but no abscess or fracture   Repeat CT results noted and shows that swelling is unchanged        Hypertension  Dyslipidemia  GERD  -Continue home bisoprolol, HCTZ, lisinopril, Crestor, PPI     Diabetes mellitus  -Holding home metformin and Victoza. Cont  insulin sliding scale with blood sugar checks    30.0 - 39.9 Obese / Body mass index is 30.38 kg/m². Estimated discharge date:   Barriers: clinical improvement     Code status: Full  Prophylaxis: Lovenox  Recommended Disposition: Home w/Family     Subjective:   Leg pain is better. Swelling and redness are better. Objective:     VITALS:   Last 24hrs VS reviewed since prior progress note. Most recent are:  Patient Vitals for the past 24 hrs:   Temp Pulse Resp BP SpO2   22 1526 98.7 °F (37.1 °C) 80 19 129/69 96 %   22 1140 98 °F (36.7 °C) 80 17 126/67 95 %   22 0754 97.6 °F (36.4 °C) 64 17 114/61 96 %   22 0315 97.9 °F (36.6 °C) 73 18 130/76 96 %   22 2325 97.9 °F (36.6 °C) 76 18 (!) 133/54 99 %   22 1948 98.8 °F (37.1 °C) 76 18 138/77 99 %       Intake/Output Summary (Last 24 hours) at 2022 1900  Last data filed at 2022 0315  Gross per 24 hour   Intake 360 ml   Output 350 ml   Net 10 ml        I had a face to face encounter and independently examined this patient on 2022, as outlined below:  PHYSICAL EXAM:  General:  cooperative, no acute distress    EENT:  EOMI. Anicteric sclerae. MMM  Resp:  CTA bilaterally, no wheezing or rales. No accessory muscle use  CV:  Regular  rhythm,  No edema  GI:  Soft, Non distended, Non tender. +Bowel sounds  Neurologic:  Alert and oriented X 3, normal speech,   Psych:   Not anxious nor agitated  Skin:  No rashes. No jaundice. Left foot wound around 2 cm x 1 cm, no drainage. Tenderness on touch his left foot which is worse than yesterday, peripheral pulse +2 in the lower extremities    Reviewed most current lab test results and cultures  YES  Reviewed most current radiology test results   YES  Review and summation of old records today    NO  Reviewed patient's current orders and MAR    YES  PMH/ reviewed - no change compared to H&P  ________________________________________________________________________  Care Plan discussed with:    Comments   Patient x    Family      RN x    Care Manager     Consultant  X  orthopedic surgery                     Multidiciplinary team rounds were held today with , nursing, pharmacist and clinical coordinator. Patient's plan of care was discussed; medications were reviewed and discharge planning was addressed. ________________________________________________________________________  Total NON critical care TIME36  Minutes    Total CRITICAL CARE TIME Spent:   Minutes non procedure based      Comments   >50% of visit spent in counseling and coordination of care     ________________________________________________________________________  Yajaira Dupree MD     Procedures: see electronic medical records for all procedures/Xrays and details which were not copied into this note but were reviewed prior to creation of Plan. LABS:  I reviewed today's most current labs and imaging studies.   Pertinent labs include:  Recent Labs     01/22/22  0346 01/20/22  0114   WBC 5.0 5.6   HGB 13.0 12.6   HCT 39.5 37.5   * 130*     Recent Labs     01/22/22  0346 01/20/22  0114    138   K 4.0 3.7    106 CO2 27 26   * 139*   BUN 12 20   CREA 0.87 1.06   CA 8.9 8.7       Signed: Ana Quan MD

## 2022-01-24 LAB
BACTERIA SPEC CULT: NORMAL
SERVICE CMNT-IMP: NORMAL

## 2022-01-25 ENCOUNTER — HOSPITAL ENCOUNTER (OUTPATIENT)
Dept: WOUND CARE | Age: 67
Discharge: HOME | End: 2022-01-25
Payer: MEDICARE

## 2022-01-25 VITALS
TEMPERATURE: 97.8 F | RESPIRATION RATE: 16 BRPM | SYSTOLIC BLOOD PRESSURE: 128 MMHG | DIASTOLIC BLOOD PRESSURE: 71 MMHG | HEART RATE: 77 BPM

## 2022-01-25 PROCEDURE — 99213 OFFICE O/P EST LOW 20 MIN: CPT

## 2022-01-25 PROCEDURE — 11042 DBRDMT SUBQ TIS 1ST 20SQCM/<: CPT

## 2022-01-25 RX ORDER — LANOLIN ALCOHOL/MO/W.PET/CERES
65 CREAM (GRAM) TOPICAL DAILY
COMMUNITY

## 2022-01-25 RX ORDER — EMPAGLIFLOZIN AND METFORMIN HYDROCHLORIDE 12.5; 1 MG/1; MG/1
1 TABLET ORAL 2 TIMES DAILY WITH MEALS
COMMUNITY

## 2022-01-25 RX ORDER — DEXTROMETHORPHAN HYDROBROMIDE, GUAIFENESIN 5; 100 MG/5ML; MG/5ML
650 LIQUID ORAL
COMMUNITY

## 2022-01-25 RX ORDER — CHOLECALCIFEROL (VITAMIN D3) 125 MCG
1 CAPSULE ORAL DAILY
COMMUNITY

## 2022-01-25 RX ORDER — MV/FA/DHA/EPA/FISH OIL/SAW/GNK 400MCG-200
1 COMBINATION PACKAGE (EA) ORAL DAILY
COMMUNITY

## 2022-01-25 RX ORDER — ENALAPRIL MALEATE 2.5 MG/1
2.5 TABLET ORAL
COMMUNITY

## 2022-01-25 RX ORDER — LIDOCAINE HYDROCHLORIDE AND EPINEPHRINE 10; 10 MG/ML; UG/ML
80 INJECTION, SOLUTION INFILTRATION; PERINEURAL ONCE
Status: COMPLETED | OUTPATIENT
Start: 2022-01-25 | End: 2022-01-25

## 2022-01-25 RX ADMIN — LIDOCAINE HYDROCHLORIDE AND EPINEPHRINE 80 MG: 10; 10 INJECTION, SOLUTION INFILTRATION; PERINEURAL at 11:48

## 2022-01-25 NOTE — WOUND CARE
01/25/22 1130   Left Leg Edema Point of Measurement   Compression Therapy Tubular elastic support bandage   Wound Foot Left;Dorsal # 1   Date First Assessed/Time First Assessed: 01/25/22 1012   Present on Hospital Admission: Yes  Wound Approximate Age at First Assessment (Weeks): 2 weeks  Primary Wound Type: Traumatic  Location: Foot  Wound Location Orientation: Left;Dorsal  Wound Desc. ..    Dressing Status New dressing applied   Cleansed Cleansed with saline   Dressing/Treatment Packing;Alginate with Ag;ABD pad;Roll gauze;Tape/Soft cloth adhesive tape   Post-Procedure Length (cm) 3.4 cm   Post-Procedure Width (cm) 3.5 cm   Post-Procedure Depth (cm) 1.2 cm   Post-Procedure Surface Area (cm^2) 11.9 cm^2   Post-Procedure Volume (cm^3) 14.28 cm^3   Undermining Starts ___ O'Clock 8 o'clock   Undermining Ends ___ O'Clock 12 o'clock   Undermining Maximum Distance (cm) 0.7 cm

## 2022-01-25 NOTE — H&P
[address]  HISTORY AND PHYSICAL    Name:  Geroge Mcburney  MR#:  [id number]  :  1955  ACCOUNT #:  [de-identified]  ADMIT DATE:  2022    CHIEF COMPLAINT:  Left foot wound. HISTORY OF PRESENT ILLNESS:  This 58-year-old diabetic male presents approximately 3 weeks after dropping a snow plow blade on his dorsal left foot. Initially, he was treated as an outpatient with antibiotics, but eventually required hospitalization for IV antibiotics. He was hospitalized for 5 days. He was discharged 4 days ago. He has been taking Keflex and clindamycin at home. He says that the foot is much improved. He denies fevers or chills. He still complains of some pain. He is not doing any wound care. PAST MEDICAL HISTORY:  Significant for diabetes, hypertension, dyslipidemia. ALLERGIES:  NONE. CURRENT MEDICATIONS:  Include Synjardy, Victoza, enalapril, doxycycline, Keflex, rosuvastatin. SOCIAL HISTORY:  Denies tobacco use. FAMILY HISTORY:  Noncontributory. REVIEW OF SYSTEMS:  Multipoint review of systems was performed and was negative except for the history of present illness. PHYSICAL EXAMINATION:  GENERAL:  He is awake, alert, well-nourished, and pleasant. VITAL SIGNS:  Temperature 97.8, pulse 77, respiratory rate 16, blood pressure 128/71. WOUND EXAMINATION:  Examination of the extremities revealed a swollen left foot. There is black eschar on his dorsal left foot that measures approximately 3 x 3 cm. There is significant surrounding erythema and tenderness, but no fluctuance. ASSESSMENT:  Dorsal left foot wound of indeterminate thickness. He has significant surrounding erythema. I am worried about a persistent abscess despite several weeks of antibiotics. Recommend unroofing eschar and cleaning out the underlying area.     PROCEDURE:  Excisional debridement of skin and subcutaneous tissue, left dorsal foot:  Under local anesthesia provided by 1% lidocaine with epinephrine, the full thickness eschar was sharply removed using a 15-blade. There was old congealed blood underneath. There was no evidence of purulence. All nonviable skin and subcutaneous tissue was removed. The wound was then irrigated with saline. There was no exposed tendon or bone. The wound was packed with alginate covered with dry dressing. He tolerated the procedure well. PLAN:  Finish antibiotics as prescribed. Start every other day wound care with Aquacel Ag and dry dressing. Continue strict elevation. He is leaving for cruise in a few weeks. He understands that he may need to cancel at least the first part of his trip. We will see how the next week goes. Follow up in 1 week.         MD TING [id number]  D:  01/25/2022 11:54  T:  01/25/2022 13:36  JOB #:  [id number]

## 2022-01-25 NOTE — WOUND CARE
01/25/22 1003   Wound Foot Left;Dorsal # 1   Date First Assessed/Time First Assessed: 01/25/22 1012   Present on Hospital Admission: Yes  Wound Approximate Age at First Assessment (Weeks): 2 weeks  Primary Wound Type: Traumatic  Location: Foot  Wound Location Orientation: Left;Dorsal  Wound Desc. ..    Wound Image    Wound Etiology Traumatic   Cleansed Cleansed with saline   Wound Length (cm) 3.4 cm   Wound Width (cm) 3.5 cm   Wound Depth (cm) 0.1 cm   Wound Surface Area (cm^2) 11.9 cm^2   Wound Volume (cm^3) 1.19 cm^3   Wound Assessment Eschar dry   Drainage Amount Small   Drainage Description Serous   Wound Odor None   Mandi-Wound/Incision Assessment Blanchable erythema   Edges Unattached edges   Pain 1   Pain Scale 1 Numeric (0 - 10)   Pain Intensity 1 4   Patient Stated Pain Goal 0   Pain Reassessment 1 Yes   Pain Location 1 Foot   Pain Orientation 1 Left   Pain Description 1 Burning;Aching   Pain Intervention(s) 1 Medication (see MAR)     Visit Vitals  /71   Pulse 77   Temp 97.8 °F (36.6 °C)   Resp 16

## 2022-01-25 NOTE — DISCHARGE INSTRUCTIONS
Discharge Instructions for  [facility]  [address] [facility]  MOB [address], [address] [place], [id number]  Telephone: [phone]    NAME:  Lupe Ford OF BIRTH:  1955  MEDICAL RECORD NUMBER:  [id number]  DATE:  1/25/2022  WOUND CARE ORDERS:  Left dorsal foot :Cleanse with saline , apply and pack with Silver Alginate cover with abd pad, roll gauze and tape . Apply single tubi  Pt./pcg/HH nurse to change (freq) every other day and as needed for compromise. F/U in 1 week. Continue antibiotic per order. May shower and clean wound with mild soap and water. TREATMENT ORDERS:    Elevate leg(s) above the level of the heart when sitting. Avoid prolonged standing in one place. Do no get dressing/wrap wet. Follow Diet as prescribed:   [x] Diet as tolerated: [x] Calorie Diabetic Diet: Low carb and no Sugar [] No Added Salt:  [] Increase Protein: [] Limit the amount of liquid you are drinking and avoid drinking in between meals           Return Appointment:  [x] Return Appointment: With DR Erik Gordon   in  [facility])  [] Nurse Visit : *** days  [] Ordered tests:    Electronically signed Marc Silva RN on 1/25/2022 at 11:07 [address] [facility] [address]: Should you experience any significant changes in your wound(s) or have questions about your wound care, please contact the [address] [place] [address] at [address] [place] [address] Street 8:00 am - 4:30. If you need help with your wound outside these hours and cannot wait until we are again available, contact your PCP or go to the hospital emergency room. PLEASE NOTE: IF YOU ARE UNABLE TO OBTAIN WOUND SUPPLIES, CONTINUE TO USE THE SUPPLIES YOU HAVE AVAILABLE UNTIL YOU ARE ABLE TO REACH US. IT IS MOST IMPORTANT TO KEEP THE WOUND COVERED AT ALL TIMES.      Physician Signature:_______________________    Date: ___________ Time:  ____________

## 2022-01-30 NOTE — PROGRESS NOTES
Physician Progress Note      PATIENT:               Mike Suggs  CSN #:                  [id number]  :                       1955  ADMIT DATE:       2022 5:21 PM  [address] [place] DATE:        2022 3:56 PM  RESPONDING  PROVIDER #:        Jesse Sanchez MD          QUERY TEXT:    Pt admitted with  cellulitis. Pt noted to have DM . If possible, please document in progress notes and discharge summary the relationship, if any, between cellulitis and DM. The medical record reflects the following:  Risk Factors: DM, Left foot crush injury with a snowplow blade, obesity  Clinical Indicators: Failed oral antibiotics as an outpatient, he took Bactrim for a week  Treatment: ortho cx, vanc, zosyn, Holding home metformin and Victoza. Start insulin sliding scale with blood sugar checks  Calvin Gaines Cancer RN/CDI   Options provided:  -- cellulitis associated with Diabetes  -- cellulitis unrelated to Diabetes  -- Other - I will add my own diagnosis  -- Disagree - Not applicable / Not valid  -- Disagree - Clinically unable to determine / Unknown  -- Refer to Clinical Documentation Reviewer    PROVIDER RESPONSE TEXT:    cellulitis associated with Diabetes.     Query created by: Karina De Paz on 2022 12:48 PM      Electronically signed by:  Jesse Sanchez MD 2022 6:45 PM

## 2022-02-01 ENCOUNTER — HOSPITAL ENCOUNTER (OUTPATIENT)
Dept: WOUND CARE | Age: 67
Discharge: HOME | End: 2022-02-01
Payer: MEDICARE

## 2022-02-01 VITALS
DIASTOLIC BLOOD PRESSURE: 70 MMHG | TEMPERATURE: 97.8 F | HEART RATE: 81 BPM | SYSTOLIC BLOOD PRESSURE: 150 MMHG | RESPIRATION RATE: 16 BRPM

## 2022-02-01 DIAGNOSIS — S91.302D UNSPECIFIED OPEN WOUND, LEFT FOOT, SUBSEQUENT ENCOUNTER: Primary | ICD-10-CM

## 2022-02-01 PROCEDURE — 97597 DBRDMT OPN WND 1ST 20 CM/<: CPT

## 2022-02-01 RX ORDER — DOXYCYCLINE 100 MG/1
100 CAPSULE ORAL 2 TIMES DAILY
Qty: 20 CAPSULE | Refills: 0 | Status: SHIPPED | OUTPATIENT
Start: 2022-02-01 | End: 2022-02-11

## 2022-02-01 NOTE — DISCHARGE INSTRUCTIONS
Patient Discharge Instructions    Zahraa Quan / [id number] : 1955    Admitted 2022 Discharged: 2022         DISCHARGE DIAGNOSIS:   Left foot cellulitis  Left foot crush injury with a snowplow blade  Hypertension  Dyslipidemia  GERD  Diabetes mellitus       Take Home Medications     {Medication reconciliation information is now added to the patient's AVS automatically when it is printed. There is no need to use this SmartLink in discharge instructions. Highlight this text and delete it to clear this message}      General drug facts      If you have a very bad allergy, wear an allergy ID at all times.  It is important that you take the medication exactly as they are prescribed.  Keep your medication in the bottles provided by the pharmacist.  Nettie Boning a list of all your drugs (prescription, natural products, vitamins, OTC) with you. Give this list to your doctor.  Do not take other medications without consulting your doctor.   Do not share your drugs with others and do not take anyone else's drugs.  Keep all drugs out of the reach of children and pets.   Most drugs may be thrown away in household trash after mixing with coffee grounds or ninfa litter and sealing in a plastic bag.   Keep a list Call your doctor for help with any side effects. If in the [place] you may also call the FDA at 7-179-ONB-5294     Talk with the doctor before starting any new drug, including OTC, natural products, or vitamins. What to do at Home    1. Recommended diet: diabetic    2. Recommended activity: Activity as tolerated    3. If you experience any of the following symptoms then please call your primary care physician or return to the emergency room if you cannot get hold of your doctor:    4. Wound Care: follow up at wound care center as scheduled    5. Lab work: cbc, cmp and Mg in 1 week     6. Bring these papers with you to your follow up appointments.  The papers will help your doctors be sure to continue the care plan from the hospital.      If you have questions regarding the hospital related prescriptions or hospital related issues please call [facility] at [phone]. You can always direct your questions to your primary care doctor if you are unable to reach your hospital physician; your PCP works as an extension of your hospital doctor just like your hospital doctor is an extension of your PCP for your time at the hospital [facility], [facility])      Follow-up with:   PCP: Patt Romero NP  Follow-up Information     Follow up With Specialties Details Why [id number] at [place] [facility]  On 1/25/2022 9:45am [address] [place] [address] Street [address] [facility], [address] [facility]  [phone]          Patt Romero NP Nurse Practitioner   [address]  [phone]        Schedule an appointment as soon as possible for a visit in 1 week             Please call for your own appointment        Information obtained by :  I understand that if any problems occur once I am at home I am to contact my physician. I understand and acknowledge receipt of the instructions indicated above.                                                                                                                                            Physician's or R.N.'s Signature                                                                  Date/Time                                                                                                                                              Patient or Representative Signature                                                          Date/Time

## 2022-02-01 NOTE — DISCHARGE INSTRUCTIONS
Discharge Instructions for  [facility]  [address] [facility]  MOB 1, [address] [facility] [place] [id number]  Telephone: [phone]    NAME:  Mirtha Honor OF BIRTH:  1955  MEDICAL RECORD NUMBER:  [id number]  DATE:  2/1/2022     WOUND CARE ORDERS:Left dorsal foot wound: Cleanse with saline & gauze. Apply/lightly pack with Silver Alginate, cover w/ABD pad, & roll gauze and single layer of tubgrip. Dressing to be changed every other day and as needed for compromise of dressing. May shower and clean wound with mild soap and water. Patient going cruise x 42 days. Return to clinic for follow up in 7 weeks. Rx - Doxycycline e-scribed today. TREATMENT ORDERS:    Elevate leg(s) when sitting. Avoid prolonged standing in one place. Do not get dressing/wrap wet. Prescription for Doxycycline sent to your pharmacy today. If wound worsens/shows any signs of infection while on your Cruise, seek medical attention  Follow Diet as prescribed:   [x] Calorie Diabetic Diet: Low carb and no Sugar [x] No Added Salt          Return Appointment:  [x] Return Appointment: With DR Rhonda Alonzo   in 6 to 7 weeks (after you return from your [facility])   Electronically signed Forrest Mace RN on 2/1/2022 at 10:01 AM     [address]: Should you experience any significant changes in your wound(s) or have questions about your wound care, please contact the [address] [place] [address] at [address] [place] [address] 8:00 am - 4:30. If you need help with your wound outside these hours and cannot wait until we are again available, contact your PCP or go to the hospital emergency room. PLEASE NOTE: IF YOU ARE UNABLE TO OBTAIN WOUND SUPPLIES, CONTINUE TO USE THE SUPPLIES YOU HAVE AVAILABLE UNTIL YOU ARE ABLE TO REACH US. IT IS MOST IMPORTANT TO KEEP THE WOUND COVERED AT ALL TIMES.      Physician Signature:_______________________    Date: ___________ Time: ____________

## 2022-02-01 NOTE — PROGRESS NOTES
[address]  WOUND CARE PROGRESS NOTE    Name:  Francheska Pierre  MR#:  [id number]  :  1955  ACCOUNT #:  [de-identified]  DATE OF SERVICE:  2022    SUBJECTIVE:  The patient returns with a traumatic wound of his left dorsal foot. He is much improved since last week's debridement. He denies fevers or chills. Wound care is being performed with Aquacel Ag. OBJECTIVE:  Physical exam reveals significantly decreased edema and erythema of the left foot. There is an open wound that measures approximately 3.0 x 3.4 x 0.8 cm. The base is a mix of old blood and slough. There is no purulence or other evidence of infection. ASSESSMENT:  Traumatic wound in left dorsal foot, improved. Requires selective debridement. PROCEDURE:  Selective debridement. DESCRIPTION OF PROCEDURE:  Under local anesthesia, nonviable tissue was sharply excised from the base of the left foot using a ring curette measuring 3.0 x 3.4 x 0.8 cm. Blood loss was less than 5 mL, there were no specimens or complications. PLAN:  Continue wound care with every other day application of Aquacel Ag and dry dressing. Strict elevation encouraged. He is leaving on a cruise next week. I think this would be fine. He and his wife seemed competent and aware of symptoms that would necessitate urgent medical attention. He is aware that he should seek medical attention with any increased redness, swelling, drainage, fever, chills, or change in blood sugar. We will see him when he returns.         MD TING [id number]  D:  2022 10:27  T:  2022 12:41  JOB #:  [id number]

## 2022-02-01 NOTE — DISCHARGE SUMMARY
Hospitalist Discharge Summary     Patient ID:  Tim Callahan  [id number]  82 y.o.  1955 1/19/2022    PCP on record: Elton Nunez NP    Admit date: 1/19/2022  Discharge date and time: 1/23/2022    DISCHARGE DIAGNOSIS:    Left foot cellulitis  Left foot crush injury with a snowplow blade  Hypertension  Dyslipidemia  GERD  Diabetes mellitus     CONSULTATIONS:  IP CONSULT TO ORTHOPEDIC SURGERY    Excerpted HPI from H&P of Guzman Walsh MD:  Quinten Wilson is a 74 y.o.   male who presents with past medical history of diabetes mellitus, hypertension, dyslipidemia is coming the hospital chief complaints of left foot pain, redness and also black spot. Patient reports that he dropped a snowplow blade on 1/11 and subsequently went to see his orthopedist today who advised him to go to the emergency department for further evaluation. He currently reports pain about 5 x 10, increases with touch and also movement and without any relieving factors. He also reports worsening swelling along with a black spot on the lateral aspect of his foot.     ______________________________________________________________________  DISCHARGE SUMMARY/HOSPITAL COURSE:  for full details see H&P, daily progress notes, labs, consult notes. Patient was admitted to hospital and noted to have left foot cellulitis due to left foot crush injury in the setting of diabetes mellitus. He was started on empiric antibiotics including vancomycin and Zosyn. Had CT which showed the stability of the swelling. Orthopedics was consulted and they did not recommend any procedure. Blood cultures negative. Patient was changed to oral antibiotics as below and was advised to follow-up with orthopedics on outpatient basis in about 3 to 4 days. He was cleared by orthopedics to be discharged for outpatient follow-up.   He is being sent home on a combination of doxycycline and Keflex to cover for MRSA.        _______________________________________________________________________  Patient seen and examined by me on discharge day. Pertinent Findings:  Gen:    Not in distress  Chest: Clear lungs  CVS:   Regular rhythm. No edema  Abd:  Soft, not distended, not tender  Neuro:  Alert, awake  _______________________________________________________________________  DISCHARGE MEDICATIONS:   Discharge Medication List as of 1/23/2022  3:14 PM      START taking these medications    Details   doxycycline (ADOXA) 100 mg tablet Take 1 Tablet by mouth two (2) times a day for 7 days. , Normal, Disp-14 Tablet, R-0      cephALEXin (Keflex) 500 mg capsule Take 1 Capsule by mouth four (4) times daily for 7 days. , Normal, Disp-28 Capsule, R-0         CONTINUE these medications which have NOT CHANGED    Details   melatonin 5 mg tablet Take 5 mg by mouth nightly., Historical Med      ascorbic acid, vitamin C, (Vitamin C) 500 mg tablet Take 500 mg by mouth., Historical Med      doxylamine succinate (Sleep Aid, Doxylamine,) 25 mg tablet Take 25 mg by mouth nightly as needed., Historical Med      icosapent ethyL (Vascepa) 1 gram capsule Take 1 Capsule by mouth two (2) times daily (with meals). , Historical Med      DOCOSAHEXAENOIC ACID PO Take  by mouth., Historical Med      nitroglycerin (NITROSTAT) 0.4 mg SL tablet by SubLINGual route every five (5) minutes as needed for Chest Pain., Historical Med      bisoprolol-hydrochlorothiazide (ZIAC) 5-6.25 mg per tablet Take 1 tablet by mouth daily. , Historical Med      omeprazole (PRILOSEC) 40 mg capsule Take 40 mg by mouth daily. , Historical Med      rosuvastatin (CRESTOR) 20 mg tablet Take 20 mg by mouth nightly., Historical Med      aspirin delayed-release 81 mg tablet Take  by mouth daily. , Historical Med      metformin (GLUCOPHAGE) 1,000 mg tablet Take 1,000 mg by mouth two (2) times daily (with meals). , Historical Med         STOP taking these medications       enalapril (VASOTEC) 2.5 mg tablet Comments:   Reason for Stopping:         promethazine (PHENERGAN) 25 mg tablet Comments:   Reason for Stopping:         oxyCODONE-acetaminophen (PERCOCET) 5-325 mg per tablet Comments:   Reason for Stopping:         ondansetron (ZOFRAN ODT) 4 mg disintegrating tablet Comments:   Reason for Stopping:         LIRAGLUTIDE (Levora Lowell 2-STEPH SC) Comments:   Reason for Stopping:         dapagliflozin (FARXIGA) 5 mg tab Comments:   Reason for Stopping:         lisinopril (PRINIVIL, ZESTRIL) 10 mg tablet Comments:   Reason for Stopping:                 Patient Follow Up Instructions:     1. Recommended diet: diabetic    2. Recommended activity: Activity as tolerated    3. If you experience any of the following symptoms then please call your primary care physician or return to the emergency room if you cannot get hold of your doctor:    4. Wound Care: follow up at wound care center as scheduled    5.  Lab work: cbc, cmp and Mg in 1 week      Follow-up Information     Follow up With Specialties Details Why [id number] at [facility]  On 1/25/2022 9:45am [address] [place] [address] [address] [facility], [address] [facility]  [phone]          Moris Delgado NP Nurse Practitioner   [place] [address]  [address] [address] [phone]        Schedule an appointment as soon as possible for a visit in 1 week          ________________________________________________________________    Risk of deterioration: High    Condition at Discharge:  Stable  __________________________________________________________________    Disposition  Home with family, no needs    ____________________________________________________________________    Code Status: Full Code  ___________________________________________________________________      Total time in minutes spent coordinating this discharge (includes going over instructions, follow-up, prescriptions, and preparing report for sign off to her PCP) :  35  minutes    Signed:  Yajaira Dupree MD

## 2022-02-01 NOTE — WOUND CARE
02/01/22 0935   Wound Foot Left;Dorsal # 1   Date First Assessed/Time First Assessed: 01/25/22 1012   Present on Hospital Admission: Yes  Wound Approximate Age at First Assessment (Weeks): 2 weeks  Primary Wound Type: Traumatic  Location: Foot  Wound Location Orientation: Left;Dorsal  Wound Desc. ..    Wound Image    Wound Etiology Traumatic   Dressing Status Old drainage noted   Cleansed Cleansed with saline   Wound Length (cm) 3 cm   Wound Width (cm) 3.4 cm   Wound Depth (cm) 0.7 cm   Wound Surface Area (cm^2) 10.2 cm^2   Change in Wound Size % 14.29   Wound Volume (cm^3) 7.14 cm^3   Wound Healing % -500   Undermining Starts ___ O'Clock 9 o'clock   Undermining Ends ___ O'Clock 12 o'clock   Undermining Maximum Distance (cm) 1.2 cm   Wound Assessment Eschar moist;Slough;Pink/red   Drainage Amount Moderate   Drainage Description Serosanguinous   Wound Odor None   Mandi-Wound/Incision Assessment Blanchable erythema   Edges Flat/open edges   Wound Thickness Description Full thickness   Pain 1   Pain Scale 1 Numeric (0 - 10)   Pain Intensity 1 0       Visit Vitals  BP (!) 150/70   Pulse 81   Temp 97.8 °F (36.6 °C)   Resp 16

## 2022-03-19 PROBLEM — L03.116 LEFT LEG CELLULITIS: Status: ACTIVE | Noted: 2022-01-19

## 2022-03-22 ENCOUNTER — HOSPITAL ENCOUNTER (OUTPATIENT)
Dept: WOUND CARE | Age: 67
Discharge: HOME | End: 2022-03-22
Payer: MEDICARE

## 2022-03-22 VITALS
TEMPERATURE: 97.7 F | RESPIRATION RATE: 16 BRPM | SYSTOLIC BLOOD PRESSURE: 160 MMHG | DIASTOLIC BLOOD PRESSURE: 74 MMHG | HEART RATE: 76 BPM

## 2022-03-22 DIAGNOSIS — S91.302D WOUND, OPEN, FOOT, LEFT, SUBSEQUENT ENCOUNTER: Primary | ICD-10-CM

## 2022-03-22 PROCEDURE — 97597 DBRDMT OPN WND 1ST 20 CM/<: CPT

## 2022-03-22 RX ORDER — CLOBETASOL PROPIONATE 0.5 MG/G
OINTMENT TOPICAL ONCE
Status: CANCELLED | OUTPATIENT
Start: 2022-03-22 | End: 2022-03-22

## 2022-03-22 RX ORDER — LIDOCAINE HYDROCHLORIDE 40 MG/ML
SOLUTION TOPICAL ONCE
Status: CANCELLED | OUTPATIENT
Start: 2022-03-22 | End: 2022-03-22

## 2022-03-22 RX ORDER — SILVER SULFADIAZINE 10 G/1000G
CREAM TOPICAL ONCE
Status: CANCELLED | OUTPATIENT
Start: 2022-03-22 | End: 2022-03-22

## 2022-03-22 RX ORDER — BACITRACIN 500 [USP'U]/G
OINTMENT TOPICAL ONCE
Status: CANCELLED | OUTPATIENT
Start: 2022-03-22 | End: 2022-03-22

## 2022-03-22 RX ORDER — BETAMETHASONE DIPROPIONATE 0.5 MG/G
OINTMENT TOPICAL ONCE
Status: CANCELLED | OUTPATIENT
Start: 2022-03-22 | End: 2022-03-22

## 2022-03-22 RX ORDER — GENTAMICIN SULFATE 1 MG/G
OINTMENT TOPICAL ONCE
Status: CANCELLED | OUTPATIENT
Start: 2022-03-22 | End: 2022-03-22

## 2022-03-22 RX ORDER — MUPIROCIN 20 MG/G
OINTMENT TOPICAL ONCE
Status: CANCELLED | OUTPATIENT
Start: 2022-03-22 | End: 2022-03-22

## 2022-03-22 RX ORDER — LIDOCAINE HYDROCHLORIDE 20 MG/ML
JELLY TOPICAL ONCE
Status: CANCELLED | OUTPATIENT
Start: 2022-03-22 | End: 2022-03-22

## 2022-03-22 RX ORDER — LIDOCAINE 40 MG/G
CREAM TOPICAL ONCE
Status: CANCELLED | OUTPATIENT
Start: 2022-03-22 | End: 2022-03-22

## 2022-03-22 RX ORDER — BACITRACIN ZINC AND POLYMYXIN B SULFATE 500; 1000 [USP'U]/G; [USP'U]/G
OINTMENT TOPICAL ONCE
Status: CANCELLED | OUTPATIENT
Start: 2022-03-22 | End: 2022-03-22

## 2022-03-22 RX ORDER — TRIAMCINOLONE ACETONIDE 1 MG/G
OINTMENT TOPICAL ONCE
Status: CANCELLED | OUTPATIENT
Start: 2022-03-22 | End: 2022-03-22

## 2022-03-22 NOTE — DISCHARGE INSTRUCTIONS
Discharge Instructions for  [facility]  [address]  MOB 1, [address] [facility] Bernie Sorto, [id number]  Telephone: [phone]    NAME:  Bakari Schroeder OF BIRTH:  1955  MEDICAL RECORD NUMBER:  [id number]  DATE:  3/22/2022  WOUND CARE ORDERS:  Cleanse with saline , apply primary dressing medihoney hydrogel sheet cover with secondary dressing   border foam .  Apply single tubi  Pt./pcg/HH nurse to change (freq) every other day and as needed for compromise. F/U in 1 week. TREATMENT ORDERS:    Elevate leg(s) above the level of the heart when sitting. Avoid prolonged standing in one place. Do no get dressing/wrap wet. Follow Diet as prescribed:   [] Diet as tolerated: [] Calorie Diabetic Diet: Low carb and no Sugar [] No Added Salt:  [] Increase Protein: [] Limit the amount of liquid you are drinking and avoid drinking in between meals           Return Appointment:  [x] Return Appointment: With DR Navdeep Shelton   in  1 [facility])  [] Nurse Visit : *** days  [] Ordered tests:    Electronically signed Anahy Wiggins RN on 3/22/2022 at 9:49 AM     [address] Information: Should you experience any significant changes in your wound(s) or have questions about your wound care, please contact the [facility] at [address] [place] [address] 8:00 am - 4:30. If you need help with your wound outside these hours and cannot wait until we are again available, contact your PCP or go to the hospital emergency room. PLEASE NOTE: IF YOU ARE UNABLE TO OBTAIN WOUND SUPPLIES, CONTINUE TO USE THE SUPPLIES YOU HAVE AVAILABLE UNTIL YOU ARE ABLE TO REACH US. IT IS MOST IMPORTANT TO KEEP THE WOUND COVERED AT ALL TIMES.      Physician Signature:_______________________    Date: ___________ Time:  ____________

## 2022-03-22 NOTE — PROGRESS NOTES
DATE OF SERVICE:  02/01/2022     SUBJECTIVE:  The patient returns with a traumatic wound of his left dorsal foot He survived his cruise but did switch to [employer]. He denies fevers or chills.       OBJECTIVE:  Physical exam reveals significantly decreased edema and erythema of the left foot. There is an open wound that measures approximately 2 x 1.5 x 0.4cm. The base is a mix of granulation and slough. There is no purulence or other evidence of infection.     ASSESSMENT:  Traumatic wound in left dorsal foot, improved. Requires selective debridement.     PROCEDURE:  Selective debridement.     DESCRIPTION OF PROCEDURE:  Under local anesthesia, nonviable tissue was sharply excised from the base of the left foot using a ring curette measuring 2 x 1.5 x 0.4cm. Blood loss was less than 5 mL, there were no specimens or complications.     PLAN:  Continue wound care with medihoney and dry dressing changed every other day. Cont elevation and blood glucose control.  Follow up next week before they leave for Rosario Chong MD

## 2022-03-22 NOTE — WOUND CARE
03/22/22 0938   Wound Foot Left;Dorsal # 1   Date First Assessed/Time First Assessed: 01/25/22 1012   Present on Hospital Admission: Yes  Wound Approximate Age at First Assessment (Weeks): 2 weeks  Primary Wound Type: Traumatic  Location: Foot  Wound Location Orientation: Left;Dorsal  Wound Desc. ..    Wound Image    Wound Etiology Traumatic   Dressing Status Old drainage noted   Cleansed Cleansed with saline   Wound Length (cm) 2 cm   Wound Width (cm) 1.5 cm   Wound Depth (cm) 0.4 cm   Wound Surface Area (cm^2) 3 cm^2   Change in Wound Size % 74.79   Wound Volume (cm^3) 1.2 cm^3   Wound Healing % -1   Wound Assessment [place]/red;Slough   Drainage Amount Moderate   Drainage Description Serosanguinous   Wound Odor None   Mandi-Wound/Incision Assessment Intact   Edges Flat/open edges   Wound Thickness Description Full thickness     Visit Vitals  BP (!) 160/74 (BP 1 Location: Left upper arm, BP Patient Position: At rest)   Pulse 76   Temp 97.7 °F (36.5 °C)   Resp 16

## 2022-03-24 PROBLEM — S91.302D WOUND, OPEN, FOOT, LEFT, SUBSEQUENT ENCOUNTER: Status: ACTIVE | Noted: 2022-03-22

## 2022-03-29 ENCOUNTER — HOSPITAL ENCOUNTER (OUTPATIENT)
Dept: WOUND CARE | Age: 67
Discharge: HOME | End: 2022-03-29
Payer: MEDICARE

## 2022-03-29 VITALS
RESPIRATION RATE: 16 BRPM | SYSTOLIC BLOOD PRESSURE: 156 MMHG | DIASTOLIC BLOOD PRESSURE: 84 MMHG | HEART RATE: 77 BPM | TEMPERATURE: 98.1 F

## 2022-03-29 DIAGNOSIS — S91.302D WOUND, OPEN, FOOT, LEFT, SUBSEQUENT ENCOUNTER: Primary | ICD-10-CM

## 2022-03-29 PROCEDURE — 99213 OFFICE O/P EST LOW 20 MIN: CPT

## 2022-03-29 RX ORDER — LIDOCAINE HYDROCHLORIDE 40 MG/ML
SOLUTION TOPICAL ONCE
Status: CANCELLED | OUTPATIENT
Start: 2022-03-29 | End: 2022-03-29

## 2022-03-29 RX ORDER — TRIAMCINOLONE ACETONIDE 1 MG/G
OINTMENT TOPICAL ONCE
Status: CANCELLED | OUTPATIENT
Start: 2022-03-29 | End: 2022-03-29

## 2022-03-29 RX ORDER — BACITRACIN 500 [USP'U]/G
OINTMENT TOPICAL ONCE
Status: CANCELLED | OUTPATIENT
Start: 2022-03-29 | End: 2022-03-29

## 2022-03-29 RX ORDER — GENTAMICIN SULFATE 1 MG/G
OINTMENT TOPICAL ONCE
Status: CANCELLED | OUTPATIENT
Start: 2022-03-29 | End: 2022-03-29

## 2022-03-29 RX ORDER — BACITRACIN ZINC AND POLYMYXIN B SULFATE 500; 1000 [USP'U]/G; [USP'U]/G
OINTMENT TOPICAL ONCE
Status: CANCELLED | OUTPATIENT
Start: 2022-03-29 | End: 2022-03-29

## 2022-03-29 RX ORDER — SILVER SULFADIAZINE 10 G/1000G
CREAM TOPICAL ONCE
Status: CANCELLED | OUTPATIENT
Start: 2022-03-29 | End: 2022-03-29

## 2022-03-29 RX ORDER — LIDOCAINE 40 MG/G
CREAM TOPICAL ONCE
Status: CANCELLED | OUTPATIENT
Start: 2022-03-29 | End: 2022-03-29

## 2022-03-29 RX ORDER — CLOBETASOL PROPIONATE 0.5 MG/G
OINTMENT TOPICAL ONCE
Status: CANCELLED | OUTPATIENT
Start: 2022-03-29 | End: 2022-03-29

## 2022-03-29 RX ORDER — LIDOCAINE HYDROCHLORIDE 20 MG/ML
JELLY TOPICAL ONCE
Status: CANCELLED | OUTPATIENT
Start: 2022-03-29 | End: 2022-03-29

## 2022-03-29 RX ORDER — MUPIROCIN 20 MG/G
OINTMENT TOPICAL ONCE
Status: CANCELLED | OUTPATIENT
Start: 2022-03-29 | End: 2022-03-29

## 2022-03-29 RX ORDER — BETAMETHASONE DIPROPIONATE 0.5 MG/G
OINTMENT TOPICAL ONCE
Status: CANCELLED | OUTPATIENT
Start: 2022-03-29 | End: 2022-03-29

## 2022-03-29 NOTE — DISCHARGE INSTRUCTIONS
Discharge Instructions/Wound Orders  [address], [address] [place] [address] Bernie Sorto, [id number]  Telephone: [phone]    NAME:  Kuldip Negrete OF BIRTH:  1955  MEDICAL RECORD NUMBER:  [id number]  DATE:  3/29/2022  Wound Care Orders:    Left dorsal foot  Cleanse with saline , apply primary dressing medihoney hydrogel sheet cover with secondary dressing   border foam .     Pt./pcg/HH nurse to change (freq) every other day and as needed for compromise. F/U on April 19th   Dietary:  [] Diet as tolerated: [] Calorie Diabetic Diet:Low carb and no Sugar [] No Added Salt:[] Increase Protein: [] Other:Limit the amount of liquid you are drinking and avoid drinking in between meals   Activity:  [] Activity as tolerated:  [] Patient has no activity restrictions     [] Strict Bedrest: [] Remain off Work:     [] May return to full duty work:                                   [] Return to work with restrictions:             Return Appointment:   [x] Return Appointment: With DR Sherry Isidro   On April 19th  [] Ordered tests:    Electronically signed Dontae Polanco RN on 3/29/2022 at 10:00 AM     Betina Prescott 281: Should you experience any significant changes in your wound(s) or have questions about your wound care, please contact the [address] [place] [address] at [address] [place] [address] 8:00 am - 4:30. If you need help with your wound outside these hours and cannot wait until we are again available, contact your PCP or go to the hospital emergency room. PLEASE NOTE: IF YOU ARE UNABLE TO OBTAIN WOUND SUPPLIES, CONTINUE TO USE THE SUPPLIES YOU HAVE AVAILABLE UNTIL YOU ARE ABLE TO REACH US. IT IS MOST IMPORTANT TO KEEP THE WOUND COVERED AT ALL TIMES.      Physician Signature:_______________________    Date: ___________ Time:  ____________

## 2022-03-29 NOTE — WOUND CARE
03/29/22 0955   Wound Foot Left;Dorsal # 1   Date First Assessed/Time First Assessed: 01/25/22 1012   Present on Hospital Admission: Yes  Wound Approximate Age at First Assessment (Weeks): 2 weeks  Primary Wound Type: Traumatic  Location: Foot  Wound Location Orientation: Left;Dorsal  Wound Desc. ..    Wound Image    Wound Etiology Traumatic   Dressing Status Old drainage noted   Cleansed Cleansed with saline   Wound Length (cm) 1.4 cm   Wound Width (cm) 1 cm   Wound Depth (cm) 0.2 cm   Wound Surface Area (cm^2) 1.4 cm^2   Change in Wound Size % 88.24   Wound Volume (cm^3) 0.28 cm^3   Wound Healing % 76   Wound Assessment Pink/red   Drainage Amount Moderate   Drainage Description Serosanguinous   Wound Odor None   Mandi-Wound/Incision Assessment Intact   Edges Flat/open edges   Wound Thickness Description Full thickness

## 2022-03-29 NOTE — PROGRESS NOTES
DATE OF SERVICE:  03/29/2022     SUBJECTIVE:  The patient returns with a traumatic wound of his left dorsal foot. Using Ojeda Khai denies fevers or chills.       OBJECTIVE:  Physical exam reveals significantly decreased edema and erythema of the left foot.  There is an open wound that measures approximately 1.4 x 1.0 x 0.2cm. The base is a clean and granulating.     ASSESSMENT:  Traumatic wound in left dorsal foot, improved.        PLAN:  Continue wound care with medihoney and dry dressing changed every other day. Follow up 3 weeks.

## 2022-04-19 ENCOUNTER — HOSPITAL ENCOUNTER (OUTPATIENT)
Dept: WOUND CARE | Age: 67
Discharge: HOME | End: 2022-04-19
Payer: MEDICARE

## 2022-04-19 VITALS
DIASTOLIC BLOOD PRESSURE: 78 MMHG | SYSTOLIC BLOOD PRESSURE: 144 MMHG | RESPIRATION RATE: 16 BRPM | TEMPERATURE: 97.1 F | HEART RATE: 72 BPM

## 2022-04-19 DIAGNOSIS — S91.302D WOUND, OPEN, FOOT, LEFT, SUBSEQUENT ENCOUNTER: Primary | ICD-10-CM

## 2022-04-19 PROCEDURE — 97597 DBRDMT OPN WND 1ST 20 CM/<: CPT

## 2022-04-19 RX ORDER — BETAMETHASONE DIPROPIONATE 0.5 MG/G
OINTMENT TOPICAL ONCE
OUTPATIENT
Start: 2022-04-19 | End: 2022-04-19

## 2022-04-19 RX ORDER — TRIAMCINOLONE ACETONIDE 1 MG/G
OINTMENT TOPICAL ONCE
OUTPATIENT
Start: 2022-04-19 | End: 2022-04-19

## 2022-04-19 RX ORDER — MUPIROCIN 20 MG/G
OINTMENT TOPICAL ONCE
OUTPATIENT
Start: 2022-04-19 | End: 2022-04-19

## 2022-04-19 RX ORDER — LIDOCAINE HYDROCHLORIDE 20 MG/ML
JELLY TOPICAL ONCE
OUTPATIENT
Start: 2022-04-19 | End: 2022-04-19

## 2022-04-19 RX ORDER — CLOBETASOL PROPIONATE 0.5 MG/G
OINTMENT TOPICAL ONCE
OUTPATIENT
Start: 2022-04-19 | End: 2022-04-19

## 2022-04-19 RX ORDER — LIDOCAINE HYDROCHLORIDE 40 MG/ML
SOLUTION TOPICAL ONCE
OUTPATIENT
Start: 2022-04-19 | End: 2022-04-19

## 2022-04-19 RX ORDER — GENTAMICIN SULFATE 1 MG/G
OINTMENT TOPICAL ONCE
OUTPATIENT
Start: 2022-04-19 | End: 2022-04-19

## 2022-04-19 RX ORDER — BACITRACIN ZINC AND POLYMYXIN B SULFATE 500; 1000 [USP'U]/G; [USP'U]/G
OINTMENT TOPICAL ONCE
OUTPATIENT
Start: 2022-04-19 | End: 2022-04-19

## 2022-04-19 RX ORDER — LIDOCAINE 40 MG/G
CREAM TOPICAL ONCE
OUTPATIENT
Start: 2022-04-19 | End: 2022-04-19

## 2022-04-19 RX ORDER — SILVER SULFADIAZINE 10 G/1000G
CREAM TOPICAL ONCE
OUTPATIENT
Start: 2022-04-19 | End: 2022-04-19

## 2022-04-19 RX ORDER — BACITRACIN 500 [USP'U]/G
OINTMENT TOPICAL ONCE
OUTPATIENT
Start: 2022-04-19 | End: 2022-04-19

## 2022-04-19 NOTE — WOUND CARE
04/19/22 1017   Wound Foot Left;Dorsal # 1   Date First Assessed/Time First Assessed: 01/25/22 1012   Present on Hospital Admission: Yes  Wound Approximate Age at First Assessment (Weeks): 2 weeks  Primary Wound Type: Traumatic  Location: Foot  Wound Location Orientation: Left;Dorsal  Wound Desc. ..    Wound Image    Wound Etiology Traumatic   Dressing Status Old drainage noted   Cleansed Cleansed with saline   Dressing/Treatment   (Bandaid)   Wound Length (cm) 0.3 cm   Wound Width (cm) 0.4 cm   Wound Depth (cm) 0.1 cm   Wound Surface Area (cm^2) 0.12 cm^2   Change in Wound Size % 98.99   Wound Volume (cm^3) 0.012 cm^3   Wound Healing % 99   Wound Assessment Pink/red   Drainage Amount Scant   Drainage Description Serosanguinous   Wound Odor None   Mandi-Wound/Incision Assessment Intact   Edges Flat/open edges   Wound Thickness Description Full thickness     Visit Vitals  BP (!) 144/78 (BP 1 Location: Right upper arm, BP Patient Position: At rest;Reclining)   Pulse 72   Temp 97.1 °F (36.2 °C)   Resp 16

## 2022-04-19 NOTE — DISCHARGE INSTRUCTIONS
Discharge Instructions/Wound Orders  [address], [address] [place] [address] Bernie Sorto, [id number]  Telephone: [phone]    NAME:  Leonarda Cranker  YOB: 1955  MEDICAL RECORD NUMBER:  [id number]  DATE:  4/19/2022  Wound Care Orders:  Left dorsal foot wound :Cleanse with saline , apply primary dressing medihoney hydrogel sheet cover with secondary dressing   border foam . Pt./pcg/HH nurse to change (freq) 3 x week and as needed for compromise. F/U as needed upon return from travel. Dietary:  [x] Diet as tolerated: [] Calorie Diabetic Diet:Low carb and no Sugar [] No Added Salt:[x] Increase Protein: [] Other:Limit the amount of liquid you are drinking and avoid drinking in between meals   Activity:  [x] Activity as tolerated:  [] Patient has no activity restrictions     [] Strict Bedrest: [] Remain off Work:     [] May return to full duty work:                                   [] Return to work with restrictions:             Return Appointment:  [x] Return Appointment: Follow-up as needed upon return from travel. [] Ordered tests:    Electronically signed Sherif Steele RN on 4/19/2022 at 10:24 AM     eBtina Prescott 281: Should you experience any significant changes in your wound(s) or have questions about your wound care, please contact the [address] [place] [address] at [address] [place] [address] 8:00 am - 4:30. If you need help with your wound outside these hours and cannot wait until we are again available, contact your PCP or go to the hospital emergency room. PLEASE NOTE: IF YOU ARE UNABLE TO OBTAIN WOUND SUPPLIES, CONTINUE TO USE THE SUPPLIES YOU HAVE AVAILABLE UNTIL YOU ARE ABLE TO REACH US. IT IS MOST IMPORTANT TO KEEP THE WOUND COVERED AT ALL TIMES.      Physician Signature:_______________________    Date: ___________ Time:  ____________

## 2022-05-03 NOTE — PROGRESS NOTES
DATE OF SERVICE:  04/19/2022     SUBJECTIVE:  The patient returns with a traumatic wound of his left dorsal foot. Using medihoney. Wound has healed.     OBJECTIVE:  Physical exam reveals significantly decreased edema and erythema of the left foot.  wound healed.      ASSESSMENT:  Traumatic wound in left dorsal foot, healed.        PLAN:  f/u prn.

## 2023-02-15 ENCOUNTER — ANESTHESIA EVENT (OUTPATIENT)
Dept: ENDOSCOPY | Age: 68
End: 2023-02-15
Payer: MEDICARE

## 2023-02-15 NOTE — ANESTHESIA PREPROCEDURE EVALUATION
Anesthetic History   No history of anesthetic complications            Review of Systems / Medical History  Patient summary reviewed, nursing notes reviewed and pertinent labs reviewed    Pulmonary          Smoker      Comments: Former 20 pack year smoker, quit in 2000   Neuro/Psych             Comments: Hx of cerebral aneurysm Cardiovascular    Hypertension: well controlled          Hyperlipidemia    Exercise tolerance: >4 METS     GI/Hepatic/Renal  Within defined limits              Endo/Other    Diabetes: poorly controlled         Other Findings   Comments: Colon polyps           Physical Exam    Airway  Mallampati: II  TM Distance: 4 - 6 cm  Neck ROM: normal range of motion   Mouth opening: Normal     Cardiovascular  Regular rate and rhythm,  S1 and S2 normal,  no murmur, click, rub, or gallop  Rhythm: regular  Rate: normal         Dental    Dentition: Full upper dentures     Pulmonary  Breath sounds clear to auscultation               Abdominal  GI exam deferred       Other Findings            Anesthetic Plan    ASA: 2  Anesthesia type: general and total IV anesthesia          Induction: Intravenous  Anesthetic plan and risks discussed with: Patient

## 2023-02-16 ENCOUNTER — HOSPITAL ENCOUNTER (OUTPATIENT)
Age: 68
Setting detail: OUTPATIENT SURGERY
Discharge: HOME | End: 2023-02-16
Attending: INTERNAL MEDICINE | Admitting: INTERNAL MEDICINE
Payer: MEDICARE

## 2023-02-16 ENCOUNTER — ANESTHESIA (OUTPATIENT)
Dept: ENDOSCOPY | Age: 68
End: 2023-02-16
Payer: MEDICARE

## 2023-02-16 VITALS
BODY MASS INDEX: 27.57 KG/M2 | HEIGHT: 73 IN | OXYGEN SATURATION: 96 % | WEIGHT: 208 LBS | HEART RATE: 69 BPM | RESPIRATION RATE: 18 BRPM | DIASTOLIC BLOOD PRESSURE: 61 MMHG | SYSTOLIC BLOOD PRESSURE: 115 MMHG

## 2023-02-16 PROCEDURE — 2709999900 HC NON-CHARGEABLE SUPPLY: Performed by: INTERNAL MEDICINE

## 2023-02-16 PROCEDURE — 74011000250 HC RX REV CODE- 250: Performed by: NURSE ANESTHETIST, CERTIFIED REGISTERED

## 2023-02-16 PROCEDURE — 74011250636 HC RX REV CODE- 250/636: Performed by: NURSE ANESTHETIST, CERTIFIED REGISTERED

## 2023-02-16 PROCEDURE — 76060000031 HC ANESTHESIA FIRST 0.5 HR: Performed by: INTERNAL MEDICINE

## 2023-02-16 PROCEDURE — 74011250636 HC RX REV CODE- 250/636: Performed by: INTERNAL MEDICINE

## 2023-02-16 PROCEDURE — 76040000019: Performed by: INTERNAL MEDICINE

## 2023-02-16 RX ORDER — DEXTROMETHORPHAN/PSEUDOEPHED 2.5-7.5/.8
1.2 DROPS ORAL
Status: DISCONTINUED | OUTPATIENT
Start: 2023-02-16 | End: 2023-02-16 | Stop reason: HOSPADM

## 2023-02-16 RX ORDER — ATROPINE SULFATE 0.1 MG/ML
0.5 INJECTION INTRAVENOUS
Status: DISCONTINUED | OUTPATIENT
Start: 2023-02-16 | End: 2023-02-16 | Stop reason: HOSPADM

## 2023-02-16 RX ORDER — EPINEPHRINE 0.1 MG/ML
1 INJECTION INTRACARDIAC; INTRAVENOUS
Status: DISCONTINUED | OUTPATIENT
Start: 2023-02-16 | End: 2023-02-16 | Stop reason: HOSPADM

## 2023-02-16 RX ORDER — SODIUM CHLORIDE 9 MG/ML
75 INJECTION, SOLUTION INTRAVENOUS CONTINUOUS
Status: DISCONTINUED | OUTPATIENT
Start: 2023-02-16 | End: 2023-02-16 | Stop reason: HOSPADM

## 2023-02-16 RX ORDER — SODIUM CHLORIDE 0.9 % (FLUSH) 0.9 %
5-40 SYRINGE (ML) INJECTION EVERY 8 HOURS
Status: DISCONTINUED | OUTPATIENT
Start: 2023-02-16 | End: 2023-02-16 | Stop reason: HOSPADM

## 2023-02-16 RX ORDER — FLUMAZENIL 0.1 MG/ML
0.2 INJECTION INTRAVENOUS
Status: DISCONTINUED | OUTPATIENT
Start: 2023-02-16 | End: 2023-02-16 | Stop reason: HOSPADM

## 2023-02-16 RX ORDER — NALOXONE HYDROCHLORIDE 0.4 MG/ML
0.4 INJECTION, SOLUTION INTRAMUSCULAR; INTRAVENOUS; SUBCUTANEOUS
Status: DISCONTINUED | OUTPATIENT
Start: 2023-02-16 | End: 2023-02-16 | Stop reason: HOSPADM

## 2023-02-16 RX ORDER — LIDOCAINE HYDROCHLORIDE 20 MG/ML
INJECTION, SOLUTION EPIDURAL; INFILTRATION; INTRACAUDAL; PERINEURAL AS NEEDED
Status: DISCONTINUED | OUTPATIENT
Start: 2023-02-16 | End: 2023-02-16 | Stop reason: HOSPADM

## 2023-02-16 RX ORDER — PROPOFOL 10 MG/ML
INJECTION, EMULSION INTRAVENOUS AS NEEDED
Status: DISCONTINUED | OUTPATIENT
Start: 2023-02-16 | End: 2023-02-16 | Stop reason: HOSPADM

## 2023-02-16 RX ORDER — SODIUM CHLORIDE 0.9 % (FLUSH) 0.9 %
5-40 SYRINGE (ML) INJECTION AS NEEDED
Status: DISCONTINUED | OUTPATIENT
Start: 2023-02-16 | End: 2023-02-16 | Stop reason: HOSPADM

## 2023-02-16 RX ADMIN — PROPOFOL 25 MG: 10 INJECTION, EMULSION INTRAVENOUS at 08:45

## 2023-02-16 RX ADMIN — PROPOFOL 100 MG: 10 INJECTION, EMULSION INTRAVENOUS at 08:38

## 2023-02-16 RX ADMIN — PROPOFOL 50 MG: 10 INJECTION, EMULSION INTRAVENOUS at 08:42

## 2023-02-16 RX ADMIN — SODIUM CHLORIDE 75 ML/HR: 9 INJECTION, SOLUTION INTRAVENOUS at 08:27

## 2023-02-16 RX ADMIN — PROPOFOL 25 MG: 10 INJECTION, EMULSION INTRAVENOUS at 08:48

## 2023-02-16 RX ADMIN — LIDOCAINE HYDROCHLORIDE 40 MG: 20 INJECTION, SOLUTION EPIDURAL; INFILTRATION; INTRACAUDAL; PERINEURAL at 08:38

## 2023-02-16 NOTE — ROUTINE PROCESS
TRANSFER - IN REPORT:    Verbal report received from Allison(name) on [facility]  being received from endo(unit) for routine progression of care      Report consisted of patients Situation, Background, Assessment and   Recommendations(SBAR). Information from the following report(s) SBAR, Procedure Summary, and MAR was reviewed with the receiving nurse. Opportunity for questions and clarification was provided. Assessment completed upon patients arrival to unit and care assumed.

## 2023-02-16 NOTE — ANESTHESIA POSTPROCEDURE EVALUATION
Procedure(s):  COLONOSCOPY. general, total IV anesthesia    Anesthesia Post Evaluation        Patient location during evaluation: PACU  Note status: Adequate. Level of consciousness: responsive to verbal stimuli and sleepy but conscious  Pain management: satisfactory to patient  Airway patency: patent  Anesthetic complications: no  Cardiovascular status: acceptable  Respiratory status: acceptable  Hydration status: acceptable  Comments: +Post-Anesthesia Evaluation and Assessment    Patient: Kalyani Oh MRN: [id number]  SSN: [id number]   YOB: 1955  Age: 69 y.o. Sex: male      Cardiovascular Function/Vital Signs    /62   Pulse 73   Resp 15   Ht 6' 1\" (1.854 m)   Wt 94.3 kg (208 lb)   SpO2 98%   BMI 27.44 kg/m²     Patient is status post Procedure(s):  COLONOSCOPY. Nausea/Vomiting: Controlled. Postoperative hydration reviewed and adequate. Pain:  Pain Scale 1: Numeric (0 - 10) (02/16/23 0855)  Pain Intensity 1: 0 (02/16/23 0855)   Managed. Neurological Status: At baseline. Mental Status and Level of Consciousness: Arousable. Pulmonary Status:   O2 Device: None (Room air) (02/16/23 0855)   Adequate oxygenation and airway patent. Complications related to anesthesia: None    Post-anesthesia assessment completed. No concerns. Signed By: Doc Byrd MD    2/16/2023  Post anesthesia nausea and vomiting:  controlled      INITIAL Post-op Vital signs:   Vitals Value Taken Time   BP     Temp     Pulse 68 02/16/23 0901   Resp     SpO2     Vitals shown include unvalidated device data.

## 2023-02-16 NOTE — H&P
Gastroenterology Outpatient History and Physical    Patient: Marino Piedra    Physician: Wilbert Betancur MD    Chief Complaint: H/o colon polyps  History of Present Illness: No GI complaints    History:  Past Medical History:   Diagnosis Date    Aneurysm ([facility]     brain    Diabetes ([facility].)     Hypercholesterolemia     Hypertension       Past Surgical History:   Procedure Laterality Date    HX CHOLECYSTECTOMY  2015    Laparoscopic cholecystectomy    HX COLONOSCOPY      HX GI      colonoscopy    HX ORTHOPAEDIC      left wrist fx    SD UNLISTED NEUROLOGICAL/NEUROMUSCULAR DX PX  1997    Brain Surgery @ [facility] - hemorrhage       Social History     Socioeconomic History    Marital status:    Tobacco Use    Smoking status: Former     Packs/day: 1.00     Years: 20.00     Pack years: 20.00     Types: Cigarettes     Quit date: 2000     Years since quittin.0    Smokeless tobacco: Never   Vaping Use    Vaping Use: Never used   Substance and Sexual Activity    Alcohol use: Yes     Alcohol/week: 8.0 standard drinks     Types: 8 Cans of beer per week    Drug use: No      Family History   Problem Relation Age of Onset    Stroke Mother     Brain Aneurysm  Mother     Hypertension Father     Cancer Father         Bone and Lung Cancer    Bone cancer Father     Lung Cancer Father     No Known Problems Sister       Patient Active Problem List   Diagnosis Code    DM (diabetes mellitus) ([facility] E11.9    HTN (hypertension) I10    Hypercholesterolemia E78.00    Left leg cellulitis L03.116    Wound, open, foot, left, subsequent encounter S91.302D       Allergies: No Known Allergies  Medications:   Prior to Admission medications    Medication Sig Start Date End Date Taking? Authorizing Provider   empagliflozin-metFORMIN Elinosilvestre Thorpe) 12.5-1,000 mg per tablet Take 1 Tablet by mouth two (2) times daily (with meals).    Yes Provider, Historical   liraglutide (VICTOZA) 0.6 mg/0.1 mL (18 mg/3 mL) pnij 1.8 mg by SubCUTAneous route daily. Yes Provider, Historical   cholecalciferol, vitamin D3, 50 mcg (2,000 unit) tab Take 1 Tablet by mouth daily. Yes Provider, Historical   krill oil 500 mg cap Take 1 Capsule by mouth daily. Yes Provider, Historical   ferrous sulfate 325 mg (65 mg iron) tablet Take 65 mg by mouth daily. Yes Provider, Historical   enalapril (VASOTEC) 2.5 mg tablet Take 2.5 mg by mouth nightly. Yes Provider, Historical   melatonin 5 mg tablet Take 5 mg by mouth nightly. Yes Provider, Historical   ascorbic acid, vitamin C, (VITAMIN C) 500 mg tablet Take 500 mg by mouth. Yes Provider, Historical   doxylamine succinate (UNISOM) 25 mg tablet Take 25 mg by mouth nightly as needed. Yes Provider, Historical   icosapent ethyL (VASCEPA) 1 gram capsule Take 1 Capsule by mouth two (2) times daily (with meals). Yes Provider, Historical   bisoprolol-hydroCHLOROthiazide (ZIAC) 5-6.25 mg per tablet Take 1 tablet by mouth daily. Yes Provider, Historical   omeprazole (PRILOSEC) 40 mg capsule Take 40 mg by mouth daily. Yes Provider, Historical   rosuvastatin (CRESTOR) 20 mg tablet Take 20 mg by mouth nightly. Yes Provider, Historical   aspirin delayed-release 81 mg tablet Take  by mouth daily. Yes Provider, Historical     Physical Exam:   Vital Signs: Blood pressure 132/76, pulse 74, resp. rate 17, height 6' 1\" (1.854 m), weight 94.3 kg (208 lb), SpO2 97 %.   General: well developed, well nourished   HEENT: unremarkable   Heart: regular rhythm no mumur    Lungs: clear   Abdominal:  benign   Neurological: unremarkable   Extremities: no edema     Findings/Diagnosis: H/o colon polyps  Plan of Care/Planned Procedure: Colonoscopy with conscious/deep sedation    Signed:  Alejandra Roberts MD 2/16/2023

## 2023-02-16 NOTE — DISCHARGE INSTRUCTIONS
Krzysztof Clark  [id number]  1955    COLON DISCHARGE INSTRUCTIONS  Discomfort:  Redness at IV site- apply warm compress to area; if redness or soreness persist- contact your physician  There may be a slight amount of blood passed from the rectum  Gaseous discomfort- walking, belching will help relieve any discomfort  You may not operate a vehicle for 12 hours  You may not engage in an occupation involving machinery or appliances for rest of today  You may not drink alcoholic beverages for at least 12 hours  Avoid making any critical decisions for at least 24 hour  DIET:   Regular diet. - however -  remember your colon is empty and a heavy meal will produce gas. Avoid these foods:  vegetables, fried / greasy foods, carbonated drinks for today  MEDICATION:  Per Medication Reconciliation       ACTIVITY:  You may not resume your normal daily activities until tomorrow AM; it is recommended that you spend the remainder of the day resting -  avoid any strenuous activity. CALL M.D. ANY SIGN OF:   Increasing pain, nausea, vomiting  Abdominal distension (swelling)  New increased bleeding (oral or rectal)  Fever (chills)  Pain in chest area  Bloody discharge from nose or mouth  Shortness of breath    You may  take any Advil, Aspirin, Ibuprofen, Motrin, Aleve, or Goodys for 10 days, ONLY  Tylenol as needed for pain.     IMPRESSION:  Impression:    Normal colonoscopy through to the cecum  Medium sized internal hemorrhoids seen on retroflexion    Recommendations:   Repeat colonoscopy in 5 years for surveillance    Follow-up Instructions:   Call Dr. Patricia Childs for the results of procedure / biopsy in 7-10 days  Telephone # [phone]      Estrada Bridges MD     Patient Education on Sedation / Analgesia Administered for Procedure      For 24 hours after general anesthesia or intravenous analgesia / sedation:  Have someone responsible help you with your care  Limit your activities  Do not drive and operate hazardous machinery  Do not make important personal, legal or business decisions  Do not drink alcoholic beverages  If you have not urinated within 8 hours after discharge, please contact your physician  Resume your medications unless otherwise instructed    For 24 hours after general anesthesia or intravenous analgesia / sedation  you may experience:  Drowsiness, dizziness, sleepiness, or confusion  Difficulty remembering or delayed reaction times  Difficulty with your balance, especially while walking, move slowly and carefully, do not make sudden position changes  Difficulty focusing or blurred vision    You may not be aware of slight changes in your behavior and/or your reaction time because of the medication used during and after your procedure.     Report the following to your physician:  Excessive pain, swelling, redness or odor of or around the surgical area  Temperature over 100.5  Nausea and vomiting lasting longer than 4 hours or if unable to take medications  Any signs of decreased circulation or nerve impairment to extremity: change in color, persistent numbness, tingling, coldness or increase pain  Any questions or concerns    IF YOU REPORT TO AN EMERGENCY ROOM, DOCTOR'S OFFICE OR HOSPITAL WITHIN 24 HOURS AFTER YOUR PROCEDURE, BRING THIS SHEET AND YOUR AFTER VISIT SUMMARY WITH YOU AND GIVE IT TO THE PHYSICIAN OR NURSE ATTENDING YOU.

## 2023-02-16 NOTE — PROCEDURES
NAME:  Lg Bowens   :   1955   MRN:   [id number]     Date/Time:  2023 8:50 AM    Colonoscopy Operative Report    Procedure Type:   Colonoscopy --screening     Indications:     Personal history of colon polyps (screening only)  Pre-operative Diagnosis: see indication above  Post-operative Diagnosis:  See findings below  :  Renee Blackburn MD  Referring Provider: --Brynn Zhang MD    Exam:  Airway: clear, no airway problems anticipated  Heart: RRR, without gallops or rubs  Lungs: clear bilaterally without wheezes, crackles, or rhonchi  Abdomen: soft, nontender, nondistended, bowel sounds present  Mental Status: awake, alert and oriented to person, place and time    Sedation:  MAC anesthesia Propofol  Procedure Details:  After informed consent was obtained with all risks and benefits of procedure explained and preoperative exam completed, the patient was taken to the endoscopy suite and placed in the left lateral decubitus position. Upon sequential sedation as per above, a digital rectal exam was performed demonstrating internal hemorrhoids. The Olympus videocolonoscope  was inserted in the rectum and carefully advanced to the cecum, which was identified by the ileocecal valve and appendiceal orifice. The quality of preparation was good. The colonoscope was slowly withdrawn with careful evaluation between folds. Retroflexion in the rectum was completed demonstrating internal hemorrhoids. Findings:   Normal colonoscopy through to the cecum  Medium sized internal hemorrhoids seen on retroflexion    Specimen Removed:  None  Complications: None. EBL:  None. Impression:    Normal colonoscopy through to the cecum  Medium sized internal hemorrhoids seen on retroflexion    Recommendations:   Repeat colonoscopy in 5 years for surveillance    Discharge Disposition:  Home in the company of a  when able to ambulate.       Lorelei Love MD

## 2023-02-16 NOTE — PERIOP NOTES
Endoscopy Case End Note:    4147:  Procedure scope was pre-cleaned, per protocol, at bedside by NATALIE Tamayo. 3781:  Report received from anesthesia - Tom Dickens CRNA. See anesthesia flowsheet for intra-procedure vital signs and events. 7352:  Glasses returned to patient.

## 2023-05-25 RX ORDER — ASCORBIC ACID 500 MG
500 TABLET ORAL
COMMUNITY

## 2023-05-25 RX ORDER — CHOLECALCIFEROL (VITAMIN D3) 125 MCG
5 CAPSULE ORAL NIGHTLY
COMMUNITY

## 2023-05-25 RX ORDER — BISOPROLOL FUMARATE AND HYDROCHLOROTHIAZIDE 5; 6.25 MG/1; MG/1
1 TABLET ORAL DAILY
COMMUNITY

## 2023-05-25 RX ORDER — EMPAGLIFLOZIN AND METFORMIN HYDROCHLORIDE 12.5; 1 MG/1; MG/1
1 TABLET ORAL 2 TIMES DAILY WITH MEALS
COMMUNITY

## 2023-05-25 RX ORDER — KRILL/OM-3/DHA/EPA/PHOSPHO/AST 500-110 MG
1 CAPSULE ORAL DAILY
COMMUNITY

## 2023-05-25 RX ORDER — ASPIRIN 81 MG/1
TABLET ORAL DAILY
COMMUNITY

## 2023-05-25 RX ORDER — ROSUVASTATIN CALCIUM 20 MG/1
20 TABLET, COATED ORAL NIGHTLY
COMMUNITY

## 2023-05-25 RX ORDER — OMEPRAZOLE 40 MG/1
40 CAPSULE, DELAYED RELEASE ORAL DAILY
COMMUNITY

## 2023-05-25 RX ORDER — ENALAPRIL MALEATE 2.5 MG/1
2.5 TABLET ORAL
COMMUNITY

## 2023-05-25 RX ORDER — FERROUS SULFATE 325(65) MG
65 TABLET ORAL DAILY
COMMUNITY

## 2023-05-25 RX ORDER — ICOSAPENT ETHYL 1000 MG/1
1 CAPSULE ORAL 2 TIMES DAILY WITH MEALS
COMMUNITY

## (undated) DEVICE — Device

## (undated) DEVICE — CATH IV AUTOGRD BC PNK 20GA 25 -- INSYTE

## (undated) DEVICE — SOLIDIFIER FLD 2OZ 1500CC N DISINF IN BTL DISP SAFESORB

## (undated) DEVICE — ELECTRODE,RADIOTRANSLUCENT,FOAM,5PK: Brand: MEDLINE

## (undated) DEVICE — HYPODERMIC SAFETY NEEDLE: Brand: MAGELLAN

## (undated) DEVICE — SYR 3ML LL TIP 1/10ML GRAD --

## (undated) DEVICE — SET ADMIN 16ML TBNG L100IN 2 Y INJ SITE IV PIGGY BK DISP (ORDER IN MULIPLES OF 48)

## (undated) DEVICE — SYRINGE MED 10ML LUERLOCK TIP W/O SFTY DISP

## (undated) DEVICE — NEONATAL-ADULT SPO2 SENSOR: Brand: NELLCOR

## (undated) DEVICE — 1200 GUARD II KIT W/5MM TUBE W/O VAC TUBE: Brand: GUARDIAN

## (undated) DEVICE — Z DISCONTINUED PER MEDLINE LINE GAS SAMPLING O2/CO2 LNG AD 13 FT NSL W/ TBNG FILTERLINE

## (undated) DEVICE — TOWEL 4 PLY TISS 19X30 SUE WHT

## (undated) DEVICE — BASIN EMSIS 16OZ GRAPHITE PLAS KID SHP MOLD GRAD FOR ORAL